# Patient Record
Sex: MALE | Race: WHITE | Employment: OTHER | URBAN - METROPOLITAN AREA
[De-identification: names, ages, dates, MRNs, and addresses within clinical notes are randomized per-mention and may not be internally consistent; named-entity substitution may affect disease eponyms.]

---

## 2021-08-10 ENCOUNTER — HOSPITAL ENCOUNTER (INPATIENT)
Age: 71
LOS: 3 days | Discharge: ANOTHER HOSPITAL | DRG: 309 | End: 2021-08-13
Attending: EMERGENCY MEDICINE | Admitting: INTERNAL MEDICINE
Payer: OTHER GOVERNMENT

## 2021-08-10 ENCOUNTER — APPOINTMENT (OUTPATIENT)
Dept: GENERAL RADIOLOGY | Age: 71
DRG: 309 | End: 2021-08-10
Payer: OTHER GOVERNMENT

## 2021-08-10 DIAGNOSIS — I48.91 ATRIAL FIBRILLATION WITH RVR (HCC): Primary | ICD-10-CM

## 2021-08-10 LAB
A/G RATIO: 1.3 (ref 1.1–2.2)
ALBUMIN SERPL-MCNC: 4.4 G/DL (ref 3.4–5)
ALP BLD-CCNC: 99 U/L (ref 40–129)
ALT SERPL-CCNC: 22 U/L (ref 10–40)
ANION GAP SERPL CALCULATED.3IONS-SCNC: 15 MMOL/L (ref 3–16)
APTT: 32.5 SEC (ref 26.2–38.6)
AST SERPL-CCNC: 30 U/L (ref 15–37)
BASOPHILS ABSOLUTE: 0.1 K/UL (ref 0–0.2)
BASOPHILS RELATIVE PERCENT: 0.7 %
BILIRUB SERPL-MCNC: 0.5 MG/DL (ref 0–1)
BUN BLDV-MCNC: 14 MG/DL (ref 7–20)
CALCIUM SERPL-MCNC: 10.7 MG/DL (ref 8.3–10.6)
CHLORIDE BLD-SCNC: 103 MMOL/L (ref 99–110)
CO2: 25 MMOL/L (ref 21–32)
CREAT SERPL-MCNC: 1.2 MG/DL (ref 0.8–1.3)
EOSINOPHILS ABSOLUTE: 0.1 K/UL (ref 0–0.6)
EOSINOPHILS RELATIVE PERCENT: 1.4 %
GFR AFRICAN AMERICAN: >60
GFR NON-AFRICAN AMERICAN: 60
GLOBULIN: 3.4 G/DL
GLUCOSE BLD-MCNC: 72 MG/DL (ref 70–99)
GLUCOSE BLD-MCNC: 81 MG/DL (ref 70–99)
GLUCOSE BLD-MCNC: 87 MG/DL (ref 70–99)
HCT VFR BLD CALC: 42.7 % (ref 40.5–52.5)
HEMOGLOBIN: 13.9 G/DL (ref 13.5–17.5)
INR BLD: 1.1 (ref 0.88–1.12)
LYMPHOCYTES ABSOLUTE: 1.4 K/UL (ref 1–5.1)
LYMPHOCYTES RELATIVE PERCENT: 20.7 %
MAGNESIUM: 1.5 MG/DL (ref 1.8–2.4)
MCH RBC QN AUTO: 29.8 PG (ref 26–34)
MCHC RBC AUTO-ENTMCNC: 32.5 G/DL (ref 31–36)
MCV RBC AUTO: 91.9 FL (ref 80–100)
MONOCYTES ABSOLUTE: 0.5 K/UL (ref 0–1.3)
MONOCYTES RELATIVE PERCENT: 6.8 %
NEUTROPHILS ABSOLUTE: 4.9 K/UL (ref 1.7–7.7)
NEUTROPHILS RELATIVE PERCENT: 70.4 %
PDW BLD-RTO: 13.8 % (ref 12.4–15.4)
PERFORMED ON: NORMAL
PERFORMED ON: NORMAL
PLATELET # BLD: 225 K/UL (ref 135–450)
PMV BLD AUTO: 8.3 FL (ref 5–10.5)
POTASSIUM REFLEX MAGNESIUM: 4.1 MMOL/L (ref 3.5–5.1)
PRO-BNP: 1166 PG/ML (ref 0–124)
PROTHROMBIN TIME: 12.6 SEC (ref 9.9–12.7)
RBC # BLD: 4.65 M/UL (ref 4.2–5.9)
SODIUM BLD-SCNC: 143 MMOL/L (ref 136–145)
TOTAL PROTEIN: 7.8 G/DL (ref 6.4–8.2)
TROPONIN: <0.01 NG/ML
TSH REFLEX: 1.72 UIU/ML (ref 0.27–4.2)
WBC # BLD: 6.9 K/UL (ref 4–11)

## 2021-08-10 PROCEDURE — 99222 1ST HOSP IP/OBS MODERATE 55: CPT | Performed by: PHYSICIAN ASSISTANT

## 2021-08-10 PROCEDURE — 83735 ASSAY OF MAGNESIUM: CPT

## 2021-08-10 PROCEDURE — 2060000000 HC ICU INTERMEDIATE R&B

## 2021-08-10 PROCEDURE — 71045 X-RAY EXAM CHEST 1 VIEW: CPT

## 2021-08-10 PROCEDURE — 83880 ASSAY OF NATRIURETIC PEPTIDE: CPT

## 2021-08-10 PROCEDURE — 2580000003 HC RX 258: Performed by: EMERGENCY MEDICINE

## 2021-08-10 PROCEDURE — 6360000002 HC RX W HCPCS: Performed by: INTERNAL MEDICINE

## 2021-08-10 PROCEDURE — 80053 COMPREHEN METABOLIC PANEL: CPT

## 2021-08-10 PROCEDURE — 99285 EMERGENCY DEPT VISIT HI MDM: CPT

## 2021-08-10 PROCEDURE — 6360000002 HC RX W HCPCS: Performed by: EMERGENCY MEDICINE

## 2021-08-10 PROCEDURE — 2580000003 HC RX 258: Performed by: NURSE PRACTITIONER

## 2021-08-10 PROCEDURE — 94761 N-INVAS EAR/PLS OXIMETRY MLT: CPT

## 2021-08-10 PROCEDURE — 36415 COLL VENOUS BLD VENIPUNCTURE: CPT

## 2021-08-10 PROCEDURE — 93005 ELECTROCARDIOGRAM TRACING: CPT | Performed by: EMERGENCY MEDICINE

## 2021-08-10 PROCEDURE — 6370000000 HC RX 637 (ALT 250 FOR IP): Performed by: NURSE PRACTITIONER

## 2021-08-10 PROCEDURE — 84484 ASSAY OF TROPONIN QUANT: CPT

## 2021-08-10 PROCEDURE — 96366 THER/PROPH/DIAG IV INF ADDON: CPT

## 2021-08-10 PROCEDURE — 85025 COMPLETE CBC W/AUTO DIFF WBC: CPT

## 2021-08-10 PROCEDURE — 94660 CPAP INITIATION&MGMT: CPT

## 2021-08-10 PROCEDURE — 85610 PROTHROMBIN TIME: CPT

## 2021-08-10 PROCEDURE — 84443 ASSAY THYROID STIM HORMONE: CPT

## 2021-08-10 PROCEDURE — 85730 THROMBOPLASTIN TIME PARTIAL: CPT

## 2021-08-10 PROCEDURE — 94640 AIRWAY INHALATION TREATMENT: CPT

## 2021-08-10 PROCEDURE — 2500000003 HC RX 250 WO HCPCS: Performed by: EMERGENCY MEDICINE

## 2021-08-10 PROCEDURE — 96365 THER/PROPH/DIAG IV INF INIT: CPT

## 2021-08-10 PROCEDURE — 2500000003 HC RX 250 WO HCPCS: Performed by: NURSE PRACTITIONER

## 2021-08-10 RX ORDER — ACETAMINOPHEN 325 MG/1
650 TABLET ORAL EVERY 6 HOURS PRN
Status: DISCONTINUED | OUTPATIENT
Start: 2021-08-10 | End: 2021-08-13 | Stop reason: HOSPADM

## 2021-08-10 RX ORDER — DEXTROSE MONOHYDRATE 25 G/50ML
12.5 INJECTION, SOLUTION INTRAVENOUS PRN
Status: DISCONTINUED | OUTPATIENT
Start: 2021-08-10 | End: 2021-08-13 | Stop reason: HOSPADM

## 2021-08-10 RX ORDER — LOSARTAN POTASSIUM 25 MG/1
12.5 TABLET ORAL DAILY
Status: DISCONTINUED | OUTPATIENT
Start: 2021-08-10 | End: 2021-08-13 | Stop reason: HOSPADM

## 2021-08-10 RX ORDER — ACETAMINOPHEN 650 MG/1
650 SUPPOSITORY RECTAL EVERY 6 HOURS PRN
Status: DISCONTINUED | OUTPATIENT
Start: 2021-08-10 | End: 2021-08-13 | Stop reason: HOSPADM

## 2021-08-10 RX ORDER — MAGNESIUM SULFATE IN WATER 40 MG/ML
2000 INJECTION, SOLUTION INTRAVENOUS ONCE
Status: COMPLETED | OUTPATIENT
Start: 2021-08-10 | End: 2021-08-10

## 2021-08-10 RX ORDER — SODIUM CHLORIDE 0.9 % (FLUSH) 0.9 %
5-40 SYRINGE (ML) INJECTION EVERY 12 HOURS SCHEDULED
Status: DISCONTINUED | OUTPATIENT
Start: 2021-08-10 | End: 2021-08-13 | Stop reason: HOSPADM

## 2021-08-10 RX ORDER — NICOTINE POLACRILEX 4 MG
15 LOZENGE BUCCAL PRN
Status: DISCONTINUED | OUTPATIENT
Start: 2021-08-10 | End: 2021-08-13 | Stop reason: HOSPADM

## 2021-08-10 RX ORDER — AMLODIPINE BESYLATE 10 MG/1
10 TABLET ORAL DAILY
Status: ON HOLD | COMMUNITY
End: 2021-08-13 | Stop reason: HOSPADM

## 2021-08-10 RX ORDER — GLIPIZIDE 5 MG/1
5 TABLET ORAL
COMMUNITY

## 2021-08-10 RX ORDER — ONDANSETRON 2 MG/ML
4 INJECTION INTRAMUSCULAR; INTRAVENOUS EVERY 6 HOURS PRN
Status: DISCONTINUED | OUTPATIENT
Start: 2021-08-10 | End: 2021-08-13 | Stop reason: HOSPADM

## 2021-08-10 RX ORDER — LOSARTAN POTASSIUM 25 MG/1
12.5 TABLET ORAL DAILY
Status: ON HOLD | COMMUNITY
End: 2021-08-13 | Stop reason: SDUPTHER

## 2021-08-10 RX ORDER — SODIUM CHLORIDE 9 MG/ML
25 INJECTION, SOLUTION INTRAVENOUS PRN
Status: DISCONTINUED | OUTPATIENT
Start: 2021-08-10 | End: 2021-08-13 | Stop reason: HOSPADM

## 2021-08-10 RX ORDER — VITAMIN B COMPLEX
2000 TABLET ORAL DAILY
Status: DISCONTINUED | OUTPATIENT
Start: 2021-08-10 | End: 2021-08-13 | Stop reason: HOSPADM

## 2021-08-10 RX ORDER — SODIUM CHLORIDE 0.9 % (FLUSH) 0.9 %
5-40 SYRINGE (ML) INJECTION PRN
Status: DISCONTINUED | OUTPATIENT
Start: 2021-08-10 | End: 2021-08-13 | Stop reason: HOSPADM

## 2021-08-10 RX ORDER — DILTIAZEM HYDROCHLORIDE 5 MG/ML
10 INJECTION INTRAVENOUS ONCE
Status: COMPLETED | OUTPATIENT
Start: 2021-08-10 | End: 2021-08-10

## 2021-08-10 RX ORDER — DICYCLOMINE HYDROCHLORIDE 10 MG/1
10 CAPSULE ORAL
Status: DISCONTINUED | OUTPATIENT
Start: 2021-08-10 | End: 2021-08-13 | Stop reason: HOSPADM

## 2021-08-10 RX ORDER — FUROSEMIDE 40 MG/1
40 TABLET ORAL DAILY
Status: ON HOLD | COMMUNITY
End: 2021-08-13 | Stop reason: SDUPTHER

## 2021-08-10 RX ORDER — FUROSEMIDE 40 MG/1
40 TABLET ORAL DAILY
Status: DISCONTINUED | OUTPATIENT
Start: 2021-08-10 | End: 2021-08-13 | Stop reason: HOSPADM

## 2021-08-10 RX ORDER — OMEPRAZOLE 20 MG/1
20 CAPSULE, DELAYED RELEASE ORAL DAILY
COMMUNITY

## 2021-08-10 RX ORDER — CARBOXYMETHYLCELLULOSE SODIUM 10 MG/ML
2 GEL OPHTHALMIC 4 TIMES DAILY
Status: DISCONTINUED | OUTPATIENT
Start: 2021-08-10 | End: 2021-08-13 | Stop reason: HOSPADM

## 2021-08-10 RX ORDER — DEXTROSE MONOHYDRATE 50 MG/ML
100 INJECTION, SOLUTION INTRAVENOUS PRN
Status: DISCONTINUED | OUTPATIENT
Start: 2021-08-10 | End: 2021-08-13 | Stop reason: HOSPADM

## 2021-08-10 RX ORDER — PANTOPRAZOLE SODIUM 40 MG/1
40 TABLET, DELAYED RELEASE ORAL
Status: DISCONTINUED | OUTPATIENT
Start: 2021-08-11 | End: 2021-08-13 | Stop reason: HOSPADM

## 2021-08-10 RX ORDER — ATORVASTATIN CALCIUM 10 MG/1
10 TABLET, FILM COATED ORAL DAILY
Status: DISCONTINUED | OUTPATIENT
Start: 2021-08-10 | End: 2021-08-13 | Stop reason: HOSPADM

## 2021-08-10 RX ORDER — ONDANSETRON 4 MG/1
4 TABLET, ORALLY DISINTEGRATING ORAL EVERY 8 HOURS PRN
Status: DISCONTINUED | OUTPATIENT
Start: 2021-08-10 | End: 2021-08-13 | Stop reason: HOSPADM

## 2021-08-10 RX ORDER — ATORVASTATIN CALCIUM 10 MG/1
10 TABLET, FILM COATED ORAL DAILY
Status: ON HOLD | COMMUNITY
End: 2021-08-13 | Stop reason: SDUPTHER

## 2021-08-10 RX ORDER — POLYETHYLENE GLYCOL 3350 17 G/17G
17 POWDER, FOR SOLUTION ORAL DAILY PRN
Status: DISCONTINUED | OUTPATIENT
Start: 2021-08-10 | End: 2021-08-13 | Stop reason: HOSPADM

## 2021-08-10 RX ORDER — FLUTICASONE PROPIONATE 110 UG/1
2 AEROSOL, METERED RESPIRATORY (INHALATION) 2 TIMES DAILY
Status: DISCONTINUED | OUTPATIENT
Start: 2021-08-10 | End: 2021-08-13 | Stop reason: HOSPADM

## 2021-08-10 RX ORDER — MULTIVIT-MIN/IRON/FOLIC ACID/K 18-600-40
1 CAPSULE ORAL DAILY
COMMUNITY

## 2021-08-10 RX ADMIN — DICYCLOMINE HYDROCHLORIDE 10 MG: 10 CAPSULE ORAL at 21:21

## 2021-08-10 RX ADMIN — ENOXAPARIN SODIUM 120 MG: 150 INJECTION SUBCUTANEOUS at 16:15

## 2021-08-10 RX ADMIN — Medication 2 PUFF: at 19:14

## 2021-08-10 RX ADMIN — MAGNESIUM SULFATE HEPTAHYDRATE 2000 MG: 40 INJECTION, SOLUTION INTRAVENOUS at 21:26

## 2021-08-10 RX ADMIN — CARBOXYMETHYLCELLULOSE SODIUM 2 DROP: 10 GEL OPHTHALMIC at 21:21

## 2021-08-10 RX ADMIN — DILTIAZEM HYDROCHLORIDE 5 MG/HR: 5 INJECTION INTRAVENOUS at 18:06

## 2021-08-10 RX ADMIN — DILTIAZEM HYDROCHLORIDE 5 MG/HR: 5 INJECTION INTRAVENOUS at 11:16

## 2021-08-10 RX ADMIN — DILTIAZEM HYDROCHLORIDE 10 MG: 5 INJECTION INTRAVENOUS at 11:12

## 2021-08-10 SDOH — ECONOMIC STABILITY: FOOD INSECURITY: WITHIN THE PAST 12 MONTHS, YOU WORRIED THAT YOUR FOOD WOULD RUN OUT BEFORE YOU GOT MONEY TO BUY MORE.: NEVER TRUE

## 2021-08-10 SDOH — HEALTH STABILITY: PHYSICAL HEALTH: ON AVERAGE, HOW MANY DAYS PER WEEK DO YOU ENGAGE IN MODERATE TO STRENUOUS EXERCISE (LIKE A BRISK WALK)?: 0 DAYS

## 2021-08-10 SDOH — ECONOMIC STABILITY: FOOD INSECURITY: WITHIN THE PAST 12 MONTHS, THE FOOD YOU BOUGHT JUST DIDN'T LAST AND YOU DIDN'T HAVE MONEY TO GET MORE.: NEVER TRUE

## 2021-08-10 SDOH — HEALTH STABILITY: PHYSICAL HEALTH: ON AVERAGE, HOW MANY MINUTES DO YOU ENGAGE IN EXERCISE AT THIS LEVEL?: 0 MIN

## 2021-08-10 SDOH — ECONOMIC STABILITY: HOUSING INSECURITY
IN THE LAST 12 MONTHS, WAS THERE A TIME WHEN YOU DID NOT HAVE A STEADY PLACE TO SLEEP OR SLEPT IN A SHELTER (INCLUDING NOW)?: NO

## 2021-08-10 SDOH — ECONOMIC STABILITY: INCOME INSECURITY: IN THE LAST 12 MONTHS, WAS THERE A TIME WHEN YOU WERE NOT ABLE TO PAY THE MORTGAGE OR RENT ON TIME?: NO

## 2021-08-10 ASSESSMENT — SOCIAL DETERMINANTS OF HEALTH (SDOH)
IN A TYPICAL WEEK, HOW MANY TIMES DO YOU TALK ON THE PHONE WITH FAMILY, FRIENDS, OR NEIGHBORS?: MORE THAN THREE TIMES A WEEK
HOW OFTEN DO YOU ATTENT MEETINGS OF THE CLUB OR ORGANIZATION YOU BELONG TO?: NEVER
HOW HARD IS IT FOR YOU TO PAY FOR THE VERY BASICS LIKE FOOD, HOUSING, MEDICAL CARE, AND HEATING?: NOT VERY HARD
HOW OFTEN DO YOU ATTEND CHURCH OR RELIGIOUS SERVICES?: NEVER
DO YOU BELONG TO ANY CLUBS OR ORGANIZATIONS SUCH AS CHURCH GROUPS UNIONS, FRATERNAL OR ATHLETIC GROUPS, OR SCHOOL GROUPS?: NO
HOW OFTEN DO YOU GET TOGETHER WITH FRIENDS OR RELATIVES?: ONCE A WEEK

## 2021-08-10 ASSESSMENT — PAIN SCALES - GENERAL: PAINLEVEL_OUTOF10: 0

## 2021-08-10 ASSESSMENT — LIFESTYLE VARIABLES
HOW OFTEN DO YOU HAVE A DRINK CONTAINING ALCOHOL: NEVER
HOW MANY STANDARD DRINKS CONTAINING ALCOHOL DO YOU HAVE ON A TYPICAL DAY: 1 OR 2

## 2021-08-10 NOTE — PROGRESS NOTES
Admit to PCU on tele  New onset a-fib RVR    Serena Paiz [facility]  8/10/2021

## 2021-08-10 NOTE — ED NOTES
Pt alert and without s/s distress or discomfort, denies CP, chest pressure or SOB, cardiac monitor applied. Pt showing A Fib with RVR.  MD @ bedside     Robert Code, ROBI  08/10/21 1127

## 2021-08-10 NOTE — ED NOTES
Pt states he feels \"better\" after IV Cardizem initiated.  Cardizem infusing per order and without s/s infiltration or adverse reactions     Chanda Bauer RN  08/10/21 7499

## 2021-08-10 NOTE — PROGRESS NOTES
Patient admitted to room 319 from [facility]. Patient oriented to room, call light, bed rails, phone, lights and bathroom. Patient instructed about the schedule of the day including: vital sign frequency, lab draws, possible tests, frequency of MD and staff rounds, daily weights, I &O's and prescribed diet. bed alarm in place, patient aware of placement and reason. #19 Telemetry box in place, patient aware of placement and reason. Bed locked, in lowest position, side rails up 2/4, call light within reach. Recliner Assessment  Patient is not able to demonstrated the ability to move from a reclining position to an upright position within the recliner. however patient is alert, oriented and able to provide informed consent    4 Eyes Skin Assessment     The patient is being assess for   Admission    I agree that 2 RN's have performed a thorough Head to Toe Skin Assessment on the patient. ALL assessment sites listed below have been assessed. Areas assessed for pressure by both nurses:   [x]   Head, Face, and Ears   [x]   Shoulders, Back, and Chest, Abdomen  [x]   Arms, Elbows, and Hands   [x]   Coccyx, Sacrum, and Ischium  [x]   Legs, Feet, and Heels    Scattered bruising and abrasions. Skin Assessed Under all Medical Devices by both nurses: NA                All Mepilex Borders were peeled back and area peeked at by both nurses:  No:    Please list where Mepilex Borders are located:  none in place           Co-signer eSignature: Electronically signed by Miguelina Crowell RN on 8/10/21 at 6:01 PM EDT    Does the Patient have Skin Breakdown related to pressure?   No              Filiberto Prevention initiated:  No   Wound Care Orders initiated:  NA      [facility] nurse consulted for Pressure Injury (Stage 3,4, Unstageable, DTI, NWPT, Complex wounds)and New or Established Ostomies:  NA      Primary Nurse eSignature: Electronically signed by Michelle Serra RN on 8/10/21 at 5:26 PM EDT

## 2021-08-10 NOTE — ED PROVIDER NOTES
[facility]      CHIEF COMPLAINT  Irregular Heart Beat (Pt noted to be in A fib with RVR @ [facility] 16)       [facility]  Daniele Gupta is a 72 y.o. male  who presents to the ED complaining of concern for afib with RVR. Patient sent from [facility] in [facility] after HR noted to be anywhere from . Had ECG done and noted to be in afib with RVr, which is new for him. Was at the clinic for pharmacy visit to discuss his diet and diabetes and medications. States doesn't feel bad. Feels dyspnea always per patient, no significant change to that. No chest pain. Feels lightheaded and dizzy \"a lot\" but not today. No vomiting or diarrhea. No thyroid issues that he is aware of. No other complaints, modifying factors or associated symptoms. I have reviewed the following from the nursing documentation. Past Medical History:   Diagnosis Date    Antral gastritis 11    with possible gastroparesis    Bronchitis chronic     CHF (congestive heart failure) (HCC)     Chronic abdominal pain     [facility]; EGD, col., CT, RUQ U/S, and HIDA unrem.  Chronic back pain     COPD     PFT's show no obstr. or restr. defect    Diabetes mellitus ([address]     Emphysema of lung ([address]     mild,, bullous    GERD (gastroesophageal reflux disease)     Headache(784.0)     Hiatal hernia 11    Hyperlipidemia     Neg. Cor. Angio. (per 11 o.v. note) & neg. . Echo with Myoview (per 2/25/10 note)    Hypertension     ?     Impotence     Meniere disease     MONICA (obstructive sleep apnea) 10/2/2010    severe; AHI-47; on CPAP pressure of 18    Peripheral vascular disease (HCC)     Pulmonary nodule     Pulmonary nodule     Rash     Renal cyst     small, 1 on each kidney per CT Abd. 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 Kevin Zendejas cerebral artery occlusion with cerebral infarction     Vitamin D deficiency 2012    6 ng/mL     Past Surgical History:   Procedure Laterality Date    AMPUTATION      right index    COLONOSCOPY  2011    ENDOSCOPY, COLON, DIAGNOSTIC      EXTERNAL EAR SURGERY  3/29/2012    Dr. Charlie Muro; benign squamous papilloma    SPIROMETRY  2009    Total lung capacity improvement over  PFT    UPPER GASTROINTESTINAL ENDOSCOPY  11     Dr. Richie Miles; antral gastritis, hiatal hernia, & possible gastroparesis     Family History   Problem Relation Age of Onset    Emphysema Father     Diabetes Father     Heart Failure Father     Heart Disease Father         chf    Asthma Maternal Grandmother     Stroke Mother [de-identified]    Cancer Brother 54        pancreatic    Diabetes Daughter     Asthma Daughter    [facility] Cancer Daughter     Hypertension Neg Hx      Social History     Socioeconomic History    Marital status: Single     Spouse name: Not on file    Number of children: Not on file    Years of education: Not on file    Highest education level: Not on file   Occupational History    Not on file   Tobacco Use    Smoking status: Former Smoker     Packs/day: 2.50     Years: 30.00     Pack years: 75.00     Types: Cigarettes     Quit date: 1996     Years since quittin.2    Smokeless tobacco: Never Used   Substance and Sexual Activity    Alcohol use: No    Drug use: No    Sexual activity: Never   Other Topics Concern    Not on file   Social History Narrative    Not on file     Social Determinants of Health     Financial Resource Strain:     Difficulty of Paying Living Expenses:    Food Insecurity:     Worried About Running Out of Food in the Last Year:     [address] in the Last Year:    Transportation Needs:     Lack of Transportation (Medical):      Lack of Transportation (Non-Medical):    Physical Activity:     Days of Exercise per Week:     Minutes of Exercise per Session:    Stress:     Feeling of Stress :    Social Connections:     Frequency of Communication with Friends and Family:     Frequency of Social Gatherings with Friends and Family:     Attends Denominational Services:     Active Member of Clubs or Organizations:     Attends Club or Organization Meetings:     Marital Status:    Intimate Partner Violence:     Fear of Current or Ex-Partner:     Emotionally Abused:     Physically Abused:     Sexually Abused:      Current Facility-Administered Medications   Medication Dose Route Frequency Provider Last Rate Last Admin    dilTIAZem 125 mg in dextrose 5 % 125 mL infusion  5-15 mg/hr Intravenous Continuous Asa Jimenez MD 10 mL/hr at 08/10/21 1228 10 mg/hr at 08/10/21 1228    enoxaparin (LOVENOX) injection 120 mg  1 mg/kg Subcutaneous Once Asa Jimenez MD         Current Outpatient Medications   Medication Sig Dispense Refill    amLODIPine (NORVASC) 10 MG tablet Take 10 mg by mouth daily      losartan (COZAAR) 25 MG tablet Take 12.5 mg by mouth daily      metoprolol tartrate (LOPRESSOR) 25 MG tablet Take 25 mg by mouth 2 times daily      furosemide (LASIX) 40 MG tablet Take 40 mg by mouth daily      budesonide (PULMICORT) 180 MCG/ACT AEPB inhaler Inhale 2 puffs into the lungs 2 times daily      atorvastatin (LIPITOR) 10 MG tablet Take 10 mg by mouth daily      glipiZIDE (GLUCOTROL) 5 MG tablet Take 5 mg by mouth 2 times daily (before meals) Take 2 tabs before breakfast and 1 before supper      metFORMIN (GLUCOPHAGE) 1000 MG tablet Take 1,000 mg by mouth 2 times daily (with meals)      SEMAGLUTIDE, 1 MG/DOSE, SC Inject 0.25 mg into the skin once a week      glycerin-hypromellose- 0.2-0.2-1 % SOLN opthalmic solution Place 2 drops into both eyes 4 times daily      omeprazole (PRILOSEC) 20 MG delayed release capsule Take 20 mg by mouth daily      Cholecalciferol (VITAMIN D) 50 MCG (2000 UT) CAPS capsule Take 1 capsule by mouth daily      meclizine (ANTIVERT) 25 MG CHEW Take 25 mg by mouth 3 times daily as needed      13.8 12.4 - 15.4 %    Platelets 071 699 - 300 K/uL    MPV 8.3 5.0 - 10.5 fL    Neutrophils % 70.4 %    Lymphocytes % 20.7 %    Monocytes % 6.8 %    Eosinophils % 1.4 %    Basophils % 0.7 %    Neutrophils Absolute 4.9 1.7 - 7.7 K/uL    Lymphocytes Absolute 1.4 1.0 - 5.1 K/uL    Monocytes Absolute 0.5 0.0 - 1.3 K/uL    Eosinophils Absolute 0.1 0.0 - 0.6 K/uL    Basophils Absolute 0.1 0.0 - 0.2 K/uL   Comprehensive Metabolic Panel w/ Reflex to MG   Result Value Ref Range    Sodium 143 136 - 145 mmol/L    Potassium reflex Magnesium 4.1 3.5 - 5.1 mmol/L    Chloride 103 99 - 110 mmol/L    CO2 25 21 - 32 mmol/L    Anion Gap 15 3 - 16    Glucose 81 70 - 99 mg/dL    BUN 14 7 - 20 mg/dL    CREATININE 1.2 0.8 - 1.3 mg/dL    GFR Non-African American 60 (A) >60    GFR African American >60 >60    Calcium 10.7 (H) 8.3 - 10.6 mg/dL    Total Protein 7.8 6.4 - 8.2 g/dL    Albumin 4.4 3.4 - 5.0 g/dL    Albumin/Globulin Ratio 1.3 1.1 - 2.2    Total Bilirubin 0.5 0.0 - 1.0 mg/dL    Alkaline Phosphatase 99 40 - 129 U/L    ALT 22 10 - 40 U/L    AST 30 15 - 37 U/L    Globulin 3.4 g/dL   Troponin   Result Value Ref Range    Troponin <0.01 <0.01 ng/mL   Brain Natriuretic Peptide   Result Value Ref Range    Pro-BNP 1,166 (H) 0 - 124 pg/mL   Protime-INR   Result Value Ref Range    Protime 12.6 9.9 - 12.7 sec    INR 1.10 0.88 - 1.12   APTT   Result Value Ref Range    aPTT 32.5 26.2 - 38.6 sec   EKG 12 Lead   Result Value Ref Range    Ventricular Rate 138 BPM    Atrial Rate 115 BPM    QRS Duration 140 ms    Q-T Interval 346 ms    QTc Calculation (Bazett) 524 ms    R Axis -66 degrees    T Axis 3 degrees    Diagnosis       Atrial fibrillation with rapid ventricular response with premature ventricular or aberrantly conducted complexesRight bundle branch blockLeft anterior fascicular block Bifascicular block Abnormal ECGNo previous ECGs available       ECG  The Ekg interpreted by me shows  atrial fibrillation with a rate of 138 with RVR  Eugene is Normal  QTc is  524ms  Intervals and Durations are unremarkable. ST Segments: nonspecific changes. Bifascicular block. A. fib with RVR is new when compared with previous EKG dated 2/10/2016    RADIOLOGY  XR CHEST PORTABLE    Result Date: 8/10/2021  EXAMINATION: ONE XRAY VIEW OF THE CHEST 8/10/2021 10:59 am COMPARISON: None. HISTORY: ORDERING SYSTEM PROVIDED HISTORY: new onset afib rvr TECHNOLOGIST PROVIDED HISTORY: Reason for exam:->new onset afib rvr Reason for Exam: htn, sob, hx copd, hx emphysema Acuity: Acute Type of Exam: Initial FINDINGS: Heart size and pulmonary vasculature within normal limits. Thoracic aorta tortuous. Strandy opacities in the lung bases. Lungs otherwise clear     1. No evidence of pulmonary edema 2. Bibasilar atelectasis       ED COURSE/MDM  Patient seen and evaluated. Old records reviewed. Labs and imaging reviewed and results discussed with patient. Patient presenting for evaluation of new onset A. fib with RVR found at an outpatient appointment. Denies any current chest pain, he has stable chronic dyspnea but no change today, no lightheadedness. Will attempt rate control with IV diltiazem. Patient given IV diltiazem bolus followed by drip. EKG without acute ischemic changes and troponin initially negative. Chest x-ray without any pulmonary edema. Will closely monitor his blood pressure as we titrate the diltiazem to obtain rate control with a goal rate less than 100. Single dose of therapeutic Lovenox has been given. He is established with the [facility], therefore after discussion plan for admission of the patient who would prefer to be transferred there for further evaluation and management for continuity of care. However, after consulting the providers at the [place], we were notified that they were at capacity and unable to accept the patient for transfer.   Based on that information I have informed the patient he is willing to be admitted at [facility] rather than the [place]. Hospitalist at [facility] is been consulted. I spoke with Tersea Dc with the hospitalist team at [facility]. We thoroughly discussed the history, physical exam, laboratory and imaging studies, as well as, emergency department course. Based upon that discussion, we've decided to admit Skyler Mcclure for further observation and evaluation of Myesha Hernandez's new onset afib with RVR. As I have deemed necessary from their history, physical, and studies, I have considered and evaluated Skyler Mcclure for the following diagnoses:  PNEUMONIA, CHF EXACERBATION, ACUTE CORONARY SYNDROME, PERICARDIAL TAMPONADE, PNEUMOTHORAX, PULMONARY EMBOLISM, and THORACIC DISSECTION. The total Critical Care time is 45 minutes which excludes separately billable procedures. During the patient's ED course, the patient was given:  Medications   dilTIAZem injection 10 mg (10 mg Intravenous Given 8/10/21 1112)     Followed by   dilTIAZem 125 mg in dextrose 5 % 125 mL infusion (10 mg/hr Intravenous Rate/Dose Change 8/10/21 1228)   enoxaparin (LOVENOX) injection 120 mg (has no administration in time range)        CLINICAL IMPRESSION  1. Atrial fibrillation with RVR (HCC)        Blood pressure 122/78, pulse 111, temperature 98.9 °F (37.2 °C), temperature source Oral, resp. rate 18, height 6' (1.829 m), weight 260 lb (117.9 kg), SpO2 99 %. DISPOSITION  Skyler Mcclure was admitted in stable condition. DISCLAIMER: This chart was created using Dragon dictation software. Efforts were made by me to ensure accuracy, however some errors may be present due to limitations of this technology and occasionally words are not transcribed correctly.         Rocio Pardo MD  08/10/21 1626

## 2021-08-10 NOTE — H&P
Hospital Medicine History & Physical      PCP: No primary care provider on file. Date of Admission: 8/10/2021    Date of Service: Pt seen/examined on 8/10/2021    Chief Complaint:    Chief Complaint   Patient presents with    Irregular Heart Beat     Pt noted to be in A fib with RVR @ [facility] 16     History Of Present Illness: The patient is a 72 y.o. male with CHF, chronic abdominal pain, COPD, DM, GERD, HLD, HTN who presented to [facility] ED with complaint of irregular heart beat. Patient reports he is following up with the [facility] clinic just for regular checkup when he was noted to be in A. fib with RVR. Patient states he is never been told that he has had this problem in the past.  He has a history of CHF and takes Lasix but otherwise has never been told any as there issues with his heart. He states he was otherwise feeling fine. He is always short of breath but did not notice anything new. He states the only thing that was different is that about a month ago he did have some right-sided chest pain and has some continued soreness to the right chest wall but states that it has gotten better. He denies any associated fevers or chills, palpitations, dizziness or lightheadedness. Past Medical History:        Diagnosis Date    Antral gastritis 11    with possible gastroparesis    Bronchitis chronic     CHF (congestive heart failure) (HCC)     Chronic abdominal pain     saw Dr. Luzmaria Rodriguez on 12; EGD, col., CT, RUQ U/S, and HIDA unrem.  Chronic back pain     COPD     PFT's show no obstr. or restr. defect    Diabetes mellitus ([address]     Emphysema of lung ([address]     mild,, bullous    GERD (gastroesophageal reflux disease)     Headache(784.0)     Hiatal hernia 11    Hyperlipidemia     Neg. Cor. Angio. (per 11 o.v. note) & neg. . Echo with Myoview (per 2/25/10 note)    Hypertension     ?     Impotence     Meniere disease     MONICA (obstructive sleep apnea) 10/2/2010 severe; AHI-47; on CPAP pressure of 18    Peripheral vascular disease (HCC)     Pulmonary nodule     Pulmonary nodule     Rash     Renal cyst     small, 1 on each kidney per CT Abd. 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 Miley Dentno cerebral artery occlusion with cerebral infarction     Vitamin D deficiency 2/27/2012    6 ng/mL       Past Surgical History:        Procedure Laterality Date    AMPUTATION      right index    COLONOSCOPY  9/27/2011    ENDOSCOPY, COLON, DIAGNOSTIC      EXTERNAL EAR SURGERY  3/29/2012    Dr. Efe Cuba; benign squamous papilloma    SPIROMETRY  11-2-2009    Total lung capacity improvement over 2007 PFT    UPPER GASTROINTESTINAL ENDOSCOPY  4/21/11     Dr. Margo Pina; antral gastritis, hiatal hernia, & possible gastroparesis       Medications Prior to Admission:    Prior to Admission medications    Medication Sig Start Date End Date Taking?  Authorizing Provider   amLODIPine (NORVASC) 10 MG tablet Take 10 mg by mouth daily   Yes Historical Provider, MD   losartan (COZAAR) 25 MG tablet Take 12.5 mg by mouth daily   Yes Historical Provider, MD   metoprolol tartrate (LOPRESSOR) 25 MG tablet Take 25 mg by mouth 2 times daily   Yes Historical Provider, MD   furosemide (LASIX) 40 MG tablet Take 40 mg by mouth daily   Yes Historical Provider, MD   budesonide (PULMICORT) 180 MCG/ACT AEPB inhaler Inhale 2 puffs into the lungs 2 times daily   Yes Historical Provider, MD   atorvastatin (LIPITOR) 10 MG tablet Take 10 mg by mouth daily   Yes Historical Provider, MD   glipiZIDE (GLUCOTROL) 5 MG tablet Take 5 mg by mouth 2 times daily (before meals) Take 2 tabs before breakfast and 1 before supper   Yes Historical Provider, MD   metFORMIN (GLUCOPHAGE) 1000 MG tablet Take 1,000 mg by mouth 2 times daily (with meals)   Yes Historical Provider, MD   SEMAGLUTIDE, 1 MG/DOSE, SC Inject 0.25 mg into the skin once a week   Yes Historical Provider, MD glycerin-hypromellose- 0.2-0.2-1 % SOLN opthalmic solution Place 2 drops into both eyes 4 times daily   Yes Historical Provider, MD   omeprazole (PRILOSEC) 20 MG delayed release capsule Take 20 mg by mouth daily   Yes Historical Provider, MD   Cholecalciferol (VITAMIN D) 50 MCG (2000 UT) CAPS capsule Take 1 capsule by mouth daily   Yes Historical Provider, MD   meclizine (ANTIVERT) 25 MG CHEW Take 25 mg by mouth 3 times daily as needed    Historical Provider, MD   ondansetron (ZOFRAN) 8 MG tablet Take 1 tablet by mouth every 8 hours as needed for Nausea for 10 doses. 11/13/13   Flako Anderson MD   COMBIVENT  MCG/ACT inhaler INHALE 2 PUFFS INTO THE LUNGS EVERY 6 HOURS AS NEEDED FOR WHEEZING AND SHORTNESS OF BREATH. 11/27/12   Garrel MD Phuc   dicyclomine (BENTYL) 10 MG capsule Take 10 mg by mouth 4 times daily (before meals and nightly). Historical Provider, MD       Allergies:  Patient has no known allergies. Social History:  The patient currently lives at home     TOBACCO:   reports that he quit smoking about 25 years ago. His smoking use included cigarettes. He has a 75.00 pack-year smoking history. He has never used smokeless tobacco.  ETOH:   reports no history of alcohol use.       Family History:   Positive as follows:        Problem Relation Age of Onset    Emphysema Father     Diabetes Father     Heart Failure Father     Heart Disease Father         chf    Asthma Maternal Grandmother     Stroke Mother [de-identified]    Cancer Brother 54        pancreatic    Diabetes Daughter     Asthma Daughter     Cancer Daughter     Hypertension Neg Hx        REVIEW OF SYSTEMS:     Constitutional: Negative for fever   HENT: Negative for sore throat   Eyes: Negative for redness   Respiratory: + Shortness of breath  Cardiovascular: Negative for chest pain or palpitations  Gastrointestinal: Negative for vomiting, diarrhea   Genitourinary: Negative for hematuria   Musculoskeletal: Negative for arthralgias y.o.) 0   D DM No 0   S2 Prior Stroke/TIA No 0   V Vascular Disease No 0   A Age 74-69 Yes,  (76 y.o.) 1   Sc Sex male 0    [id number]  Score  2   Score last updated 8/10/21 6:21 PM EDT    Click here for a link to the UpToDate guideline \"Atrial Fibrillation: Anticoagulation therapy to prevent embolization    Disclaimer: Risk Score calculation is dependent on accuracy of patient problem list and past encounter diagnosis.     Pertinent previous results reviewed   None     ASSESSMENT/PLAN:  New Onset Atrial fibrillation with RVR   - HR in the 140's on arrival to the ER   -  Given diltiazem bolus and started on gtt   - Tele monitor   - EKG with bifascicular block   - Troponin negaitve x 1, trend  - TSH pending   - [id number]: 2, AC with full dose Lovenox for now for PPx coverage   - Echo pending   - Cardiology consult     Hypercalcemia  - Mild, 10.7  - trend     HLD  - Continue statin     HTN  - BP is controlled   - Continue Cozaar, BB  - hold Norvasc as Cardizem started for atrial fibrillation     COPD   - No AE   - Continue home inhalers PRN     DM2  - hold home oral Rx   - Continue SSI   - BG is well controlled     Menieres Disease  - stable     Chronic dCHF  - per patient report   - Continue home Lasix   - Mild edema on exam     DVT Prophylaxis: Full dose Lovenox   Diet: No diet orders on file   Code Status: Prior      Sandi Zaragoza PA-C  8/10/2021 2:17 PM

## 2021-08-10 NOTE — ED NOTES
Report called to Celina Riley RN @ [facility] in SBAR format.  Pt to ambulance via cot without incident or c/o's     John Powers RN  08/10/21 4002

## 2021-08-11 PROBLEM — I50.9 CHF (CONGESTIVE HEART FAILURE) (HCC): Status: ACTIVE | Noted: 2021-08-11

## 2021-08-11 LAB
ANION GAP SERPL CALCULATED.3IONS-SCNC: 11 MMOL/L (ref 3–16)
BUN BLDV-MCNC: 16 MG/DL (ref 7–20)
CALCIUM SERPL-MCNC: 9.8 MG/DL (ref 8.3–10.6)
CHLORIDE BLD-SCNC: 101 MMOL/L (ref 99–110)
CO2: 27 MMOL/L (ref 21–32)
CREAT SERPL-MCNC: 1.2 MG/DL (ref 0.8–1.3)
EKG ATRIAL RATE: 115 BPM
EKG DIAGNOSIS: NORMAL
EKG Q-T INTERVAL: 346 MS
EKG QRS DURATION: 140 MS
EKG QTC CALCULATION (BAZETT): 524 MS
EKG R AXIS: -66 DEGREES
EKG T AXIS: 3 DEGREES
EKG VENTRICULAR RATE: 138 BPM
GFR AFRICAN AMERICAN: >60
GFR NON-AFRICAN AMERICAN: 60
GLUCOSE BLD-MCNC: 100 MG/DL (ref 70–99)
GLUCOSE BLD-MCNC: 120 MG/DL (ref 70–99)
GLUCOSE BLD-MCNC: 202 MG/DL (ref 70–99)
GLUCOSE BLD-MCNC: 79 MG/DL (ref 70–99)
GLUCOSE BLD-MCNC: 88 MG/DL (ref 70–99)
GLUCOSE BLD-MCNC: 99 MG/DL (ref 70–99)
HCT VFR BLD CALC: 40.2 % (ref 40.5–52.5)
HEMOGLOBIN: 13 G/DL (ref 13.5–17.5)
MCH RBC QN AUTO: 29.8 PG (ref 26–34)
MCHC RBC AUTO-ENTMCNC: 32.2 G/DL (ref 31–36)
MCV RBC AUTO: 92.4 FL (ref 80–100)
PDW BLD-RTO: 13.3 % (ref 12.4–15.4)
PERFORMED ON: ABNORMAL
PERFORMED ON: NORMAL
PERFORMED ON: NORMAL
PLATELET # BLD: 177 K/UL (ref 135–450)
PMV BLD AUTO: 8.9 FL (ref 5–10.5)
POTASSIUM SERPL-SCNC: 3.7 MMOL/L (ref 3.5–5.1)
RBC # BLD: 4.35 M/UL (ref 4.2–5.9)
SODIUM BLD-SCNC: 139 MMOL/L (ref 136–145)
TSH REFLEX FT4: 2.22 UIU/ML (ref 0.27–4.2)
WBC # BLD: 4.8 K/UL (ref 4–11)

## 2021-08-11 PROCEDURE — 93010 ELECTROCARDIOGRAM REPORT: CPT | Performed by: INTERNAL MEDICINE

## 2021-08-11 PROCEDURE — 94761 N-INVAS EAR/PLS OXIMETRY MLT: CPT

## 2021-08-11 PROCEDURE — 6360000002 HC RX W HCPCS: Performed by: NURSE PRACTITIONER

## 2021-08-11 PROCEDURE — 2060000000 HC ICU INTERMEDIATE R&B

## 2021-08-11 PROCEDURE — 6370000000 HC RX 637 (ALT 250 FOR IP): Performed by: NURSE PRACTITIONER

## 2021-08-11 PROCEDURE — 84443 ASSAY THYROID STIM HORMONE: CPT

## 2021-08-11 PROCEDURE — 94660 CPAP INITIATION&MGMT: CPT

## 2021-08-11 PROCEDURE — 99223 1ST HOSP IP/OBS HIGH 75: CPT | Performed by: INTERNAL MEDICINE

## 2021-08-11 PROCEDURE — 94640 AIRWAY INHALATION TREATMENT: CPT

## 2021-08-11 PROCEDURE — 93306 TTE W/DOPPLER COMPLETE: CPT

## 2021-08-11 PROCEDURE — 2580000003 HC RX 258: Performed by: INTERNAL MEDICINE

## 2021-08-11 PROCEDURE — 2500000003 HC RX 250 WO HCPCS: Performed by: INTERNAL MEDICINE

## 2021-08-11 PROCEDURE — 36415 COLL VENOUS BLD VENIPUNCTURE: CPT

## 2021-08-11 PROCEDURE — 6370000000 HC RX 637 (ALT 250 FOR IP): Performed by: INTERNAL MEDICINE

## 2021-08-11 PROCEDURE — 99233 SBSQ HOSP IP/OBS HIGH 50: CPT | Performed by: INTERNAL MEDICINE

## 2021-08-11 PROCEDURE — 2700000000 HC OXYGEN THERAPY PER DAY

## 2021-08-11 PROCEDURE — 85027 COMPLETE CBC AUTOMATED: CPT

## 2021-08-11 PROCEDURE — 80048 BASIC METABOLIC PNL TOTAL CA: CPT

## 2021-08-11 RX ORDER — METOPROLOL SUCCINATE 50 MG/1
50 TABLET, EXTENDED RELEASE ORAL DAILY
Status: DISCONTINUED | OUTPATIENT
Start: 2021-08-11 | End: 2021-08-11

## 2021-08-11 RX ORDER — DILTIAZEM HYDROCHLORIDE 240 MG/1
240 CAPSULE, COATED, EXTENDED RELEASE ORAL DAILY
Status: DISCONTINUED | OUTPATIENT
Start: 2021-08-11 | End: 2021-08-11

## 2021-08-11 RX ORDER — DILTIAZEM HYDROCHLORIDE 5 MG/ML
15 INJECTION INTRAVENOUS ONCE
Status: COMPLETED | OUTPATIENT
Start: 2021-08-11 | End: 2021-08-11

## 2021-08-11 RX ORDER — METOPROLOL SUCCINATE 50 MG/1
50 TABLET, EXTENDED RELEASE ORAL DAILY
Status: DISCONTINUED | OUTPATIENT
Start: 2021-08-11 | End: 2021-08-12

## 2021-08-11 RX ADMIN — DICYCLOMINE HYDROCHLORIDE 10 MG: 10 CAPSULE ORAL at 05:14

## 2021-08-11 RX ADMIN — LOSARTAN POTASSIUM 12.5 MG: 25 TABLET, FILM COATED ORAL at 15:50

## 2021-08-11 RX ADMIN — ENOXAPARIN SODIUM 120 MG: 120 INJECTION SUBCUTANEOUS at 05:14

## 2021-08-11 RX ADMIN — Medication 2 PUFF: at 19:15

## 2021-08-11 RX ADMIN — DILTIAZEM HYDROCHLORIDE 15 MG: 5 INJECTION INTRAVENOUS at 21:26

## 2021-08-11 RX ADMIN — ENOXAPARIN SODIUM 120 MG: 120 INJECTION SUBCUTANEOUS at 18:57

## 2021-08-11 RX ADMIN — DILTIAZEM HYDROCHLORIDE 5 MG/HR: 5 INJECTION INTRAVENOUS at 21:33

## 2021-08-11 RX ADMIN — ATORVASTATIN CALCIUM 10 MG: 10 TABLET, FILM COATED ORAL at 15:28

## 2021-08-11 RX ADMIN — METOPROLOL SUCCINATE 50 MG: 50 TABLET, EXTENDED RELEASE ORAL at 15:28

## 2021-08-11 RX ADMIN — FUROSEMIDE 40 MG: 40 TABLET ORAL at 09:48

## 2021-08-11 RX ADMIN — PANTOPRAZOLE SODIUM 40 MG: 40 TABLET, DELAYED RELEASE ORAL at 05:14

## 2021-08-11 RX ADMIN — DICYCLOMINE HYDROCHLORIDE 10 MG: 10 CAPSULE ORAL at 09:48

## 2021-08-11 NOTE — PROGRESS NOTES
End of shift report given to [facility], [address]. Transfer of care done at this time.

## 2021-08-11 NOTE — PLAN OF CARE
Problem: Falls - Risk of:  Goal: Will remain free from falls  Description: Will remain free from falls  8/11/2021 1122 by Jailyn Henry RN  Outcome: Ongoing  8/11/2021 0416 by Jesus Tapia RN  Outcome: Ongoing  8/11/2021 0412 by Jesus Tapia RN  Outcome: Ongoing  Goal: Absence of physical injury  Description: Absence of physical injury  8/11/2021 1122 by Jailyn Henry RN  Outcome: Ongoing  8/11/2021 0416 by Jesus Tapia RN  Outcome: Ongoing  8/11/2021 0412 by Jesus Tapia RN  Outcome: Ongoing     Problem: Cardiac:  Goal: Ability to maintain an adequate cardiac output will improve  Description: Ability to maintain an adequate cardiac output will improve  8/11/2021 1122 by Jailyn Henry RN  Outcome: Ongoing  8/11/2021 0416 by Jesus Tapia RN  Outcome: Ongoing  8/11/2021 0412 by Jesus Tapia RN  Outcome: Ongoing  Goal: Hemodynamic stability will improve  Description: Hemodynamic stability will improve  8/11/2021 1122 by Jailyn Henry RN  Outcome: Ongoing  8/11/2021 0416 by Jesus Tapia RN  Outcome: Ongoing  8/11/2021 0412 by Jesus Tapia RN  Outcome: Ongoing

## 2021-08-11 NOTE — PROGRESS NOTES
40 mg Oral QAM AC    sodium chloride flush  5-40 mL Intravenous 2 times per day    enoxaparin  1 mg/kg Subcutaneous Q12H    insulin lispro  0-6 Units Subcutaneous TID WC    insulin lispro  0-3 Units Subcutaneous Nightly       Continuous Infusions:   sodium chloride      dextrose         PRN Meds:  Combivent, sodium chloride flush, sodium chloride, ondansetron **OR** ondansetron, polyethylene glycol, acetaminophen **OR** acetaminophen, perflutren lipid microspheres, glucose, dextrose, glucagon (rDNA), dextrose      Data:  CBC:   Recent Labs     08/10/21  1037 08/11/21  0438   WBC 6.9 4.8   HGB 13.9 13.0*   HCT 42.7 40.2*   MCV 91.9 92.4    177     BMP:   Recent Labs     08/10/21  1037 08/11/21  0438    139   K 4.1 3.7    101   CO2 25 27   BUN 14 16   CREATININE 1.2 1.2     LIVER PROFILE:   Recent Labs     08/10/21  1037   AST 30   ALT 22   BILITOT 0.5   ALKPHOS 99     PT/INR:   Recent Labs     08/10/21  1037   PROTIME 12.6   INR 1.10     Cultures:  None      [id number] Score for Atrial Fibrillation Stroke Risk   Risk   Factors  Component Value   C CHF Yes 1   H HTN Yes 1   A2 Age >= 75 No,  (76 y.o.) 0   D DM Yes 1   S2 Prior Stroke/TIA No 0   V Vascular Disease No 0   A Age 74-69 Yes,  (76 y.o.) 1   Sc Sex male 0    [id number]  Score  4   Score last updated 8/11/21 9:71 AM EDT    Click here for a link to the UpToDate guideline \"Atrial Fibrillation: Anticoagulation therapy to prevent embolization    Disclaimer: Risk Score calculation is dependent on accuracy of patient problem list and past encounter diagnosis. XR CHEST PORTABLE   Final Result   1. No evidence of pulmonary edema   2.  Bibasilar atelectasis                Assessment:  Principal Problem:    Atrial fibrillation with RVR (HCC)  Active Problems:    Hypertension    Hyperlipidemia    COPD (chronic obstructive pulmonary disease) (HCC)    Hypercalcemia    Type 2 diabetes mellitus without complication (HCC)    CHF (congestive heart failure) ([facility]  Resolved Problems:    * No resolved hospital problems. *      Plan:    New Onset Atrial fibrillation with RVR   - HR in the 140's on arrival to the ER   -  Given diltiazem bolus and started on gtt. Change to PO Cardizem  mg daily.   - Tele monitor   - EKG with bifascicular block   - Troponin negaitve x 1, trend  - TSH pending   - [id number]: 4, AC with full dose Lovenox for now for PPx coverage   - Echo pending   - Cardiology consult      Hypercalcemia resolved  - Mild, 10.7-->9.8       HLD  - Continue statin      HTN  - BP is controlled   - Continue Cozaar, BB  - hold Norvasc as Cardizem started for atrial fibrillation. Will start oral Cardizem. If he has diminished EF on ECHO will switch to BB. Chronic CHF type unknown. - he has history of CHF. He does not know his EF. Await ECHO finding.  On Lasix.      COPD   - No AE   - Continue home inhalers PRN      DM2  - hold home oral Rx   - Continue SSI   - BG is well controlled      Menieres Disease  - stable       Discharge planning        DVT Prophylaxis: Full dose Lovenox   Diet: No diet orders on file   Code Status: Prior    Miya Aguilar MD, MD 8/11/2021 6:46 AM

## 2021-08-11 NOTE — PROGRESS NOTES
Bedside shift report given to Mone guerra. Patient resting in bed on room air. Remains in Afib w/ rates in low 100s on cardizem gtt at 10mg/hr. Call light in patient's reach.

## 2021-08-11 NOTE — PROGRESS NOTES
Cared discussed with Dr. Goyo Castle. EKG reviewed. Troponins negative. Agree with stress for further evaluation. Our service will see but may proceed with evaluation.        Mart Hairston MD, [address] [facility]  [phone] [facility]  [phone] [address] [place] [address]

## 2021-08-11 NOTE — CARE COORDINATION
Case Management Assessment  Initial Evaluation      Patient Name: Abdoulaye Cross  YOB: 1950  Diagnosis: Atrial fibrillation with RVR ([address] [I48.91]  Date / Time: 8/10/2021 10:30 AM    Admission status/Date: INPT 8/10/21  Chart Reviewed: Yes      Patient Interviewed: Yes   Family Interviewed:  No      Hospitalization in the last 30 days:  No      Health Care Decision Maker : Luis Armando Sar, dtr: [phone]  Katiana Rivas, son: [phone]    (CM - must 1st enter selection under Navigator - emergency contact- [place] Relationship and pick relationship)   Who do you trust or have selected to make healthcare decisions for you      Met with: pt via telephone  Interview conducted  (bedside/phone):    Current PCP: -    Financial  Medicare  Precert required for SNF : Y, N          3 night stay required - Y, N    ADLS  Support Systems/Care Needs: Friends/Neighbors  Transportation: self    Meal Preparation: self    Housing  Living Arrangements: home alone  Steps: 3  Intent for return to present living arrangements: Yes  Identified Issues: poss anticoags at discharge    [address] with [address] : No Agency:(Services)  Type of Home Care Services: None  Passport/Waiver : No  :                      Phone Number:    Passport/Waiver Services: -          Durable Medical Equiptment   DME Provider: [facility]  Equipment: -  Walker___Cane___RTS___ BSC___Shower Chair___Hospital Bed___W/C____Other________  02 at ____Liter(s)---wears(frequency)_______ HHN ___ CPAP_X__ BiPap___   N/A____      Home O2 Use :  No    If No for home O2---if presently on O2 during hospitalization:  No  if yes CM to follow for potential DC O2 need  Informed of need for care provider to bring portable home O2 tank on day of discharge for nursing to connect prior to leaving:   Not Indicated  Verbalized agreement/Understanding:   Not Indicated    Community Service Affiliation  Dialysis:  No · Agency:  · Location:  · Dialysis Schedule:  · Phone:   · Fax: Other Community Services: (ex:PT/OT,Mental Health,Wound Clinic, Cardio/Pul [address])    DISCHARGE PLAN: Explained Case Management role/services. Reviewed chart and spoke with pt via telephone. Pt states that he lives at home alone and is IPTA. Pt states that he plans to return home at discharge and denies need for hhc or other services. CM following for poss home anticoags at discharge. Addendum 8/11/2021 1636; CM spoke with pt. Pt is staying for a stress test tomorrow (8/12). Pt is active with [facility]. He has an appt on 8/19/201 at [address] he NP. CM advise pt to take the AVS with him tot he appt to show them the medication and the bottle for the NP at the [place] to fill, as pt confirmed that he gets is medications filled at the [place] and the most it should cost is $8/month. CM informed Steve Tom RN.

## 2021-08-11 NOTE — CONSULTS
[address] [place] [address]  [phone]        Reason for Consultation/Chief Complaint: \"I have been having  irreg heart beat. Kayy Money \"    History of Present Illness: Shell Ruffin is a 72 y.o. patient who presented to the hospital with complaints of  irreg heart beat. He is patient at [facility] and suffers from MONICA- CPAP pressure 18cm formerly seen by Dr Grace Lewis, DM2, HBP,HLD,  CKD II. Incidentally found to have irreg heart beat during his outpatient check up and was sent to this hospital for evaluation. He denies prior heart disease. He denies chest pain. I have been asked to provide consultation regarding further management and testing. Past Medical History:   has a past medical history of Antral gastritis, Bronchitis chronic, CHF (congestive heart failure) ([address] Chronic abdominal pain, Chronic back pain, COPD, Diabetes mellitus ([address] Emphysema of lung ([address] GERD (gastroesophageal reflux disease), Headache(784.0), Hiatal hernia, Hyperlipidemia, Hypertension, Impotence, Meniere disease, MONICA (obstructive sleep apnea), Peripheral vascular disease ([address] Pulmonary nodule, Pulmonary nodule, Rash, Renal cyst, Squamous papilloma, Unspecified cerebral artery occlusion with cerebral infarction, and Vitamin D deficiency. Surgical History:   has a past surgical history that includes spirometry (11-2-2009); amputation; Upper gastrointestinal endoscopy (4/21/11 ); External ear surgery (3/29/2012); Endoscopy, colon, diagnostic; and Colonoscopy (9/27/2011). Social History:   reports that he quit smoking about 25 years ago. His smoking use included cigarettes. He has a 75.00 pack-year smoking history. He has never used smokeless tobacco. He reports that he does not drink alcohol and does not use drugs.      Family History:  family history includes Asthma in his daughter and maternal grandmother; Cancer in his daughter; Cancer (age of onset: 54) in his brother; Diabetes in his daughter and father; Emphysema in his father; Heart Disease in his father; Heart Failure in his father; Stroke (age of onset: [de-identified]) in his mother. Home Medications:  Were reviewed and are listed in nursing record. and/or listed below  Prior to Admission medications    Medication Sig Start Date End Date Taking? Authorizing Provider   amLODIPine (NORVASC) 10 MG tablet Take 10 mg by mouth daily   Yes Historical Provider, MD   losartan (COZAAR) 25 MG tablet Take 12.5 mg by mouth daily   Yes Historical Provider, MD   metoprolol tartrate (LOPRESSOR) 25 MG tablet Take 25 mg by mouth 2 times daily   Yes Historical Provider, MD   furosemide (LASIX) 40 MG tablet Take 40 mg by mouth daily   Yes Historical Provider, MD   budesonide (PULMICORT) 180 MCG/ACT AEPB inhaler Inhale 2 puffs into the lungs 2 times daily   Yes Historical Provider, MD   atorvastatin (LIPITOR) 10 MG tablet Take 10 mg by mouth daily   Yes Historical Provider, MD   glipiZIDE (GLUCOTROL) 5 MG tablet Take 5 mg by mouth 2 times daily (before meals) Take 2 tabs before breakfast and 1 before supper   Yes Historical Provider, MD   metFORMIN (GLUCOPHAGE) 1000 MG tablet Take 1,000 mg by mouth 2 times daily (with meals)   Yes Historical Provider, MD   SEMAGLUTIDE, 1 MG/DOSE, SC Inject 0.25 mg into the skin once a week   Yes Historical Provider, MD   glycerin-hypromellose- 0.2-0.2-1 % SOLN opthalmic solution Place 2 drops into both eyes 4 times daily   Yes Historical Provider, MD   omeprazole (PRILOSEC) 20 MG delayed release capsule Take 20 mg by mouth daily   Yes Historical Provider, MD   Cholecalciferol (VITAMIN D) 50 MCG (2000 UT) CAPS capsule Take 1 capsule by mouth daily   Yes Historical Provider, MD   meclizine (ANTIVERT) 25 MG CHEW Take 25 mg by mouth 3 times daily as needed    Historical Provider, MD   dicyclomine (BENTYL) 10 MG capsule Take 10 mg by mouth 4 times daily (before meals and nightly).       Historical Provider, MD        Allergies:  Patient has no known allergies. Review of Systems:   12 point ROS negative in all areas as listed below except as in [place]  Constitutional, EENT, GI, , Musculoskeletal, skin, neurological, hematological, endocrine, Psychiatric    Physical Examination:  /76 P 112/min.  R 20, T 97.7, Spo2 91% RA   Vitals:    08/11/21 0150   BP:    Pulse:    Resp: 18   Temp:    SpO2: 91%    Weight: 253 lb 9.6 oz (115 kg)       Obese   General Appearance:  Alert, cooperative, no distress, appears stated age   Head:  Normocephalic, without obvious abnormality, atraumatic   Eyes:  PERRL, conjunctiva/corneas clear       Nose: Nares normal, no drainage or sinus tenderness   Throat: Lips, mucosa, and tongue normal   Neck:  short obese, Supple, symmetrical, trachea midline, no adenopathy, thyroid: not enlarged, symmetric, no tenderness/mass/nodules, no carotid bruit or JVD       Lungs:   Clear to auscultation bilaterally, respirations unlabored   Chest Wall:  No tenderness or deformity   Heart:  irreg irreg S2 normal, no murmur, rub or gallop   Abdomen:   Soft, non-tender, bowel sounds active all four quadrants,  no masses, no organomegaly           Extremities: Extremities normal, atraumatic, no cyanosis or edema   Pulses: 2+ and symmetric   Skin: Skin color, texture, turgor normal, no rashes or lesions   Pysch: Normal mood and affect   Neurologic: Normal gross motor and sensory exam.         Labs  CBC:   Lab Results   Component Value Date    WBC 4.8 08/11/2021    RBC 4.35 08/11/2021    HGB 13.0 08/11/2021    HCT 40.2 08/11/2021    MCV 92.4 08/11/2021    RDW 13.3 08/11/2021     08/11/2021     CMP:    Lab Results   Component Value Date     08/11/2021    K 3.7 08/11/2021    K 4.1 08/10/2021     08/11/2021    CO2 27 08/11/2021    BUN 16 08/11/2021    CREATININE 1.2 08/11/2021    GFRAA >60 08/11/2021    GFRAA >60 08/15/2012    AGRATIO 1.3 08/10/2021    LABGLOM 60 08/11/2021    GLUCOSE 88 08/11/2021    PROT 7.8 08/10/2021    PROT 7.1 08/15/2012    CALCIUM 9.8 08/11/2021    BILITOT 0.5 08/10/2021    ALKPHOS 99 08/10/2021    AST 30 08/10/2021    ALT 22 08/10/2021     PT/INR:  No results found for: PTINR  Lab Results   Component Value Date    TROPONINI <0.01 08/10/2021   troponin x3    < 0.01  ProBNP 1166  Chest xray 8.10.21     FINDINGS:   Heart size and pulmonary vasculature within normal limits.  Thoracic aorta   tortuous.  Strandy opacities in the lung bases.  Lungs otherwise clear           Impression   1. No evidence of pulmonary edema   2. Bibasilar atelectasis             EKG:  I have reviewed EKG with the following interpretation:  Impression:  8.10.21  Atrial fibrillation with rapid ventricular responseRight bundle branch blockLeft anterior fascicular block Bifascicular block    Abnormal ECGPrevious EKG on 2.10.16 had sinus rhythm. Confirmed by Zack Perez MD, [address] ([id number]) on 8/11/2021 5:14:05 AM    2.10.16     Diagnosis  Normal sinus rhythmLeft axis deviationAbnormal ECGNo previous ECGs availableConfirmed by Cande Oliva MD, Antwan Granado ([id number]) on 2/10/2016 11:42:22 AM       Echo 3.21.12  1. Mildly dilated left atrium. 2.  The left ventricle is normal in size, normal wall thickness, and   overall normal global and regional systolic function. 3.  Mild tricuspid regurgitation. 4.  Mild mitral regurgitation. 5.  Mildly elevated pulmonary artery systolic pressure. Read ByJeny Gould M.D.      Assessment  Atrial fibrillations RVR duration unknown  MONICA on treatment  HBP  HLD  CKD II  Obesity   Former smoker Heavy  Patient Active Problem List   Diagnosis    Hypertension    Hyperlipidemia    GERD (gastroesophageal reflux disease)    COPD (chronic obstructive pulmonary disease) (HCC)    Vitamin D deficiency    Vertigo    Meniere disease    Depression    Chronic abdominal pain    Atrial fibrillation with RVR ([address]    Hypercalcemia    Type 2 diabetes mellitus without complication ([address]    CHF (congestive heart failure) ([address] Plan:  TSH  ECHO  Rate control  Anticoagulation  Follow up in office in 3 weeks  Consider cardioversion in 4 weeks    I had the opportunity to review the clinical symptoms and presentation of Melanie Mendes. I have discussed Rx plan with patient     I will address the patient's cardiac risk factors and adjusted pharmacologic treatment as needed. In addition, I have reinforced the need for patient directed risk factor modification. Tobacco use was discussed with the patient and educated on the negative effects. I have asked the patient to not utilize these agents. He no longer smokes and agrees not to start back. Thank you for allowing to us to participate in the care or Melanie Mendes. Further evaluation will be based upon the patient's clinical course and testing results. All questions and concerns were addressed to the patient/family. Alternatives to my treatment were discussed. The note was completed using EMR. Every effort was made to ensure accuracy; however, inadvertent computerized transcription errors may be present.

## 2021-08-11 NOTE — PROGRESS NOTES
Pt resting in bed watching TV. He denies any pain or SOB. Assessment complete-see flowsheet. Medications given-see MAR. Pt denies further needs, call light and bedside table within reach. Will continue to monitor.

## 2021-08-11 NOTE — PROGRESS NOTES
Beta Blocker held for stress test per hospitalist orders. Patient in Afib RVR and couldn't complete stress test. Hospitalist and cardiologist aware. Do not hold beta blocker for stress test tomorrow per cardiology. New order in and hold for stress order removed.

## 2021-08-11 NOTE — ACP (ADVANCE CARE PLANNING)
Advance Care Planning   Healthcare Decision Maker: Robert Freeman, dtr: [phone]  Gordon Lynch, son: [phone]      Click here to complete Healthcare Decision Makers including selection of the Healthcare Decision Maker Relationship (ie \"Primary\").

## 2021-08-11 NOTE — PROGRESS NOTES
08/10/21 2241   NIV Type   $NIV $Daily Charge   Skin Assessment Clean, dry, & intact   Skin Protection for O2 Device Yes   Location Nose   Equipment Type system one   Mode CPAP   Mask Type Full face mask   Mask Size Large   Settings/Measurements   CPAP/EPAP 18 cmH2O   Resp 20   Comfort Level Good   Using Accessory Muscles No   SpO2 90

## 2021-08-11 NOTE — PROGRESS NOTES
Shift assessment complete. See flow sheet. Scheduled meds held for NPO (stress test scheduled around 12pm). See MAR. Patients head-toe complete, VS are logged, active bowel sound noted in all four quadrants. No needs are noted at this time. Call light and bedside table are within reach. The bed is locked and is in the lowest position.

## 2021-08-11 NOTE — PLAN OF CARE
Problem: Falls - Risk of:  Goal: Will remain free from falls  Description: Will remain free from falls  8/11/2021 0416 by Sia Avendano RN  Outcome: Ongoing  8/11/2021 0412 by Sia Avendano RN  Outcome: Ongoing  Goal: Absence of physical injury  Description: Absence of physical injury  8/11/2021 0416 by Sia Avendano RN  Outcome: Ongoing  8/11/2021 0412 by Sia Avendano RN  Outcome: Ongoing     Problem: Cardiac:  Goal: Ability to maintain an adequate cardiac output will improve  Description: Ability to maintain an adequate cardiac output will improve  8/11/2021 0416 by Sia Avendano RN  Outcome: Ongoing  8/11/2021 0412 by Sia Avendano RN  Outcome: Ongoing  Goal: Hemodynamic stability will improve  Description: Hemodynamic stability will improve  8/11/2021 0416 by Sia Avendano RN  Outcome: Ongoing  8/11/2021 0412 by Sia Avendano RN  Outcome: Ongoing     Problem: Fluid Volume:  Goal: Ability to achieve and maintain adequate urine output will improve  Description: Ability to achieve and maintain adequate urine output will improve  8/11/2021 0416 by Sia Avendano RN  Outcome: Ongoing  8/11/2021 0412 by Sia Avendano RN  Outcome: Ongoing     Problem: Respiratory:  Goal: Respiratory status will improve  Description: Respiratory status will improve  8/11/2021 0416 by Sia Avendano RN  Outcome: Ongoing  8/11/2021 0412 by Sia Avendano RN  Outcome: Ongoing     Problem: OXYGENATION/RESPIRATORY FUNCTION  Goal: Patient will maintain patent airway  Outcome: Ongoing  Note:   Patient's EF (Ejection Fraction) is unavailable, Echo scheduled for 8/11           . .................................................................................................................................................. Patient's weights and intake/output reviewed: Yes    Patient's Last Weight: 253 lbs obtained by bed scale.  Difference of 5 lbs less than last documented weight. Intake/Output Summary (Last 24 hours) at 8/11/2021 0413  Last data filed at 8/11/2021 0140  Gross per 24 hour   Intake 402 ml   Output 400 ml   Net 2 ml       Comorbidities Reviewed Yes    Patient has a past medical history of Antral gastritis, Bronchitis chronic, CHF (congestive heart failure) ([facility] Chronic abdominal pain, Chronic back pain, COPD, Diabetes mellitus ([address] Emphysema of lung ([address] GERD (gastroesophageal reflux disease), Headache(784.0), Hiatal hernia, Hyperlipidemia, Hypertension, Impotence, Meniere disease, MONICA (obstructive sleep apnea), Peripheral vascular disease ([facility] [address] Pulmonary nodule, Pulmonary nodule, Rash, Renal cyst, Squamous papilloma, Unspecified cerebral artery occlusion with cerebral infarction, and Vitamin D deficiency. >>For CHF and Comorbidity documentation on Education Time and Topics, please see Education Tab                     Pt resting in bed at this time on room air. Pt denies shortness of breath. Pt with nonpitting lower extremity edema.      Patient and/or Family's stated Goal of Care this Admission: increase activity tolerance and be more comfortable prior to discharge        :   Goal: Patient will achieve/maintain normal respiratory rate/effort  Description: Respiratory rate and effort will be within normal limits for the patient  Outcome: Ongoing     Problem: HEMODYNAMIC STATUS  Goal: Patient has stable vital signs and fluid balance  Outcome: Ongoing     Problem: FLUID AND ELECTROLYTE IMBALANCE  Goal: Fluid and electrolyte balance are achieved/maintained  Outcome: Ongoing     Problem: ACTIVITY INTOLERANCE/IMPAIRED MOBILITY  Goal: Mobility/activity is maintained at optimum level for patient  Outcome: Ongoing

## 2021-08-12 ENCOUNTER — APPOINTMENT (OUTPATIENT)
Dept: NUCLEAR MEDICINE | Age: 71
DRG: 309 | End: 2021-08-12
Payer: OTHER GOVERNMENT

## 2021-08-12 LAB
GLUCOSE BLD-MCNC: 103 MG/DL (ref 70–99)
GLUCOSE BLD-MCNC: 112 MG/DL (ref 70–99)
GLUCOSE BLD-MCNC: 112 MG/DL (ref 70–99)
GLUCOSE BLD-MCNC: 77 MG/DL (ref 70–99)
LV EF: 39 %
LVEF MODALITY: NORMAL
PERFORMED ON: ABNORMAL
PERFORMED ON: NORMAL
SARS-COV-2, NAAT: NOT DETECTED

## 2021-08-12 PROCEDURE — 93017 CV STRESS TEST TRACING ONLY: CPT

## 2021-08-12 PROCEDURE — 2500000003 HC RX 250 WO HCPCS

## 2021-08-12 PROCEDURE — 6360000002 HC RX W HCPCS: Performed by: NURSE PRACTITIONER

## 2021-08-12 PROCEDURE — 94660 CPAP INITIATION&MGMT: CPT

## 2021-08-12 PROCEDURE — 99233 SBSQ HOSP IP/OBS HIGH 50: CPT | Performed by: INTERNAL MEDICINE

## 2021-08-12 PROCEDURE — 94761 N-INVAS EAR/PLS OXIMETRY MLT: CPT

## 2021-08-12 PROCEDURE — 6370000000 HC RX 637 (ALT 250 FOR IP): Performed by: INTERNAL MEDICINE

## 2021-08-12 PROCEDURE — 78452 HT MUSCLE IMAGE SPECT MULT: CPT

## 2021-08-12 PROCEDURE — 2580000003 HC RX 258: Performed by: INTERNAL MEDICINE

## 2021-08-12 PROCEDURE — 94640 AIRWAY INHALATION TREATMENT: CPT

## 2021-08-12 PROCEDURE — 6370000000 HC RX 637 (ALT 250 FOR IP): Performed by: NURSE PRACTITIONER

## 2021-08-12 PROCEDURE — 2580000003 HC RX 258: Performed by: NURSE PRACTITIONER

## 2021-08-12 PROCEDURE — A9502 TC99M TETROFOSMIN: HCPCS | Performed by: INTERNAL MEDICINE

## 2021-08-12 PROCEDURE — 87635 SARS-COV-2 COVID-19 AMP PRB: CPT

## 2021-08-12 PROCEDURE — 6360000002 HC RX W HCPCS: Performed by: INTERNAL MEDICINE

## 2021-08-12 PROCEDURE — 2060000000 HC ICU INTERMEDIATE R&B

## 2021-08-12 PROCEDURE — 3430000000 HC RX DIAGNOSTIC RADIOPHARMACEUTICAL: Performed by: INTERNAL MEDICINE

## 2021-08-12 RX ORDER — POLYETHYLENE GLYCOL 3350 17 G/17G
17 POWDER, FOR SOLUTION ORAL DAILY PRN
Status: CANCELLED | OUTPATIENT
Start: 2021-08-12

## 2021-08-12 RX ORDER — METOPROLOL TARTRATE 5 MG/5ML
2.5 INJECTION INTRAVENOUS ONCE
Status: COMPLETED | OUTPATIENT
Start: 2021-08-12 | End: 2021-08-12

## 2021-08-12 RX ORDER — SODIUM CHLORIDE 9 MG/ML
25 INJECTION, SOLUTION INTRAVENOUS PRN
Status: CANCELLED | OUTPATIENT
Start: 2021-08-12

## 2021-08-12 RX ORDER — ACETAMINOPHEN 650 MG/1
650 SUPPOSITORY RECTAL EVERY 6 HOURS PRN
Status: CANCELLED | OUTPATIENT
Start: 2021-08-12

## 2021-08-12 RX ORDER — ONDANSETRON 4 MG/1
4 TABLET, ORALLY DISINTEGRATING ORAL EVERY 8 HOURS PRN
Status: CANCELLED | OUTPATIENT
Start: 2021-08-12

## 2021-08-12 RX ORDER — METOPROLOL TARTRATE 5 MG/5ML
INJECTION INTRAVENOUS
Status: COMPLETED
Start: 2021-08-12 | End: 2021-08-12

## 2021-08-12 RX ORDER — DICYCLOMINE HYDROCHLORIDE 10 MG/1
10 CAPSULE ORAL
Status: CANCELLED | OUTPATIENT
Start: 2021-08-12

## 2021-08-12 RX ORDER — SODIUM CHLORIDE 0.9 % (FLUSH) 0.9 %
5-40 SYRINGE (ML) INJECTION PRN
Status: CANCELLED | OUTPATIENT
Start: 2021-08-12

## 2021-08-12 RX ORDER — FUROSEMIDE 40 MG/1
40 TABLET ORAL DAILY
Status: CANCELLED | OUTPATIENT
Start: 2021-08-13

## 2021-08-12 RX ORDER — SODIUM CHLORIDE 0.9 % (FLUSH) 0.9 %
5-40 SYRINGE (ML) INJECTION EVERY 12 HOURS SCHEDULED
Status: CANCELLED | OUTPATIENT
Start: 2021-08-12

## 2021-08-12 RX ORDER — SODIUM CHLORIDE 0.9 % (FLUSH) 0.9 %
5-40 SYRINGE (ML) INJECTION PRN
OUTPATIENT
Start: 2021-08-12

## 2021-08-12 RX ORDER — ATORVASTATIN CALCIUM 10 MG/1
10 TABLET, FILM COATED ORAL DAILY
Status: CANCELLED | OUTPATIENT
Start: 2021-08-13

## 2021-08-12 RX ORDER — DEXTROSE MONOHYDRATE 25 G/50ML
12.5 INJECTION, SOLUTION INTRAVENOUS PRN
Status: CANCELLED | OUTPATIENT
Start: 2021-08-12

## 2021-08-12 RX ORDER — SODIUM CHLORIDE 9 MG/ML
INJECTION, SOLUTION INTRAVENOUS CONTINUOUS
Status: DISCONTINUED | OUTPATIENT
Start: 2021-08-12 | End: 2021-08-13 | Stop reason: HOSPADM

## 2021-08-12 RX ORDER — METOPROLOL SUCCINATE 50 MG/1
100 TABLET, EXTENDED RELEASE ORAL DAILY
Status: CANCELLED | OUTPATIENT
Start: 2021-08-13

## 2021-08-12 RX ORDER — NICOTINE POLACRILEX 4 MG
15 LOZENGE BUCCAL PRN
Status: CANCELLED | OUTPATIENT
Start: 2021-08-12

## 2021-08-12 RX ORDER — METOPROLOL SUCCINATE 50 MG/1
50 TABLET, EXTENDED RELEASE ORAL ONCE
Status: COMPLETED | OUTPATIENT
Start: 2021-08-12 | End: 2021-08-12

## 2021-08-12 RX ORDER — CARBOXYMETHYLCELLULOSE SODIUM 10 MG/ML
2 GEL OPHTHALMIC 4 TIMES DAILY
Status: CANCELLED | OUTPATIENT
Start: 2021-08-12

## 2021-08-12 RX ORDER — METOPROLOL TARTRATE 5 MG/5ML
5 INJECTION INTRAVENOUS EVERY 4 HOURS PRN
Status: DISCONTINUED | OUTPATIENT
Start: 2021-08-12 | End: 2021-08-13 | Stop reason: HOSPADM

## 2021-08-12 RX ORDER — LOSARTAN POTASSIUM 25 MG/1
12.5 TABLET ORAL DAILY
Status: CANCELLED | OUTPATIENT
Start: 2021-08-13

## 2021-08-12 RX ORDER — PANTOPRAZOLE SODIUM 40 MG/1
40 TABLET, DELAYED RELEASE ORAL
Status: CANCELLED | OUTPATIENT
Start: 2021-08-13

## 2021-08-12 RX ORDER — SODIUM CHLORIDE 9 MG/ML
25 INJECTION, SOLUTION INTRAVENOUS PRN
OUTPATIENT
Start: 2021-08-12

## 2021-08-12 RX ORDER — FLUTICASONE PROPIONATE 110 UG/1
2 AEROSOL, METERED RESPIRATORY (INHALATION) 2 TIMES DAILY
Status: CANCELLED | OUTPATIENT
Start: 2021-08-12

## 2021-08-12 RX ORDER — METOPROLOL SUCCINATE 50 MG/1
100 TABLET, EXTENDED RELEASE ORAL DAILY
Status: DISCONTINUED | OUTPATIENT
Start: 2021-08-13 | End: 2021-08-13 | Stop reason: HOSPADM

## 2021-08-12 RX ORDER — ACETAMINOPHEN 325 MG/1
650 TABLET ORAL EVERY 6 HOURS PRN
Status: CANCELLED | OUTPATIENT
Start: 2021-08-12

## 2021-08-12 RX ORDER — VITAMIN B COMPLEX
2000 TABLET ORAL DAILY
Status: CANCELLED | OUTPATIENT
Start: 2021-08-13

## 2021-08-12 RX ORDER — SODIUM CHLORIDE 0.9 % (FLUSH) 0.9 %
5-40 SYRINGE (ML) INJECTION EVERY 12 HOURS SCHEDULED
OUTPATIENT
Start: 2021-08-12

## 2021-08-12 RX ORDER — ONDANSETRON 2 MG/ML
4 INJECTION INTRAMUSCULAR; INTRAVENOUS EVERY 6 HOURS PRN
Status: CANCELLED | OUTPATIENT
Start: 2021-08-12

## 2021-08-12 RX ORDER — SODIUM CHLORIDE 9 MG/ML
INJECTION, SOLUTION INTRAVENOUS CONTINUOUS
OUTPATIENT
Start: 2021-08-12

## 2021-08-12 RX ORDER — DEXTROSE MONOHYDRATE 50 MG/ML
100 INJECTION, SOLUTION INTRAVENOUS PRN
Status: CANCELLED | OUTPATIENT
Start: 2021-08-12

## 2021-08-12 RX ADMIN — SODIUM CHLORIDE: 9 INJECTION, SOLUTION INTRAVENOUS at 21:48

## 2021-08-12 RX ADMIN — ENOXAPARIN SODIUM 120 MG: 120 INJECTION SUBCUTANEOUS at 05:47

## 2021-08-12 RX ADMIN — Medication 10 ML: at 09:38

## 2021-08-12 RX ADMIN — METOPROLOL TARTRATE 2.5 MG: 5 INJECTION INTRAVENOUS at 09:37

## 2021-08-12 RX ADMIN — Medication 2 PUFF: at 19:41

## 2021-08-12 RX ADMIN — Medication 2 PUFF: at 07:29

## 2021-08-12 RX ADMIN — METOPROLOL SUCCINATE 50 MG: 50 TABLET, EXTENDED RELEASE ORAL at 10:51

## 2021-08-12 RX ADMIN — REGADENOSON 0.4 MG: 0.08 INJECTION, SOLUTION INTRAVENOUS at 12:03

## 2021-08-12 RX ADMIN — LOSARTAN POTASSIUM 12.5 MG: 25 TABLET, FILM COATED ORAL at 14:38

## 2021-08-12 RX ADMIN — ATORVASTATIN CALCIUM 10 MG: 10 TABLET, FILM COATED ORAL at 14:39

## 2021-08-12 RX ADMIN — TETROFOSMIN 10.7 MILLICURIE: 1.38 INJECTION, POWDER, LYOPHILIZED, FOR SOLUTION INTRAVENOUS at 10:43

## 2021-08-12 RX ADMIN — METOPROLOL SUCCINATE 50 MG: 50 TABLET, EXTENDED RELEASE ORAL at 07:04

## 2021-08-12 RX ADMIN — TETROFOSMIN 33.4 MILLICURIE: 1.38 INJECTION, POWDER, LYOPHILIZED, FOR SOLUTION INTRAVENOUS at 12:03

## 2021-08-12 NOTE — PROGRESS NOTES
Shift assessment complete, see flowsheets. Pts HR staying in 130s and as high as 160s. Bolus of cardizem given and started on gtt, see MAR. No other needs or discomforts stated at this time. Call light in easy reach, bedside table in easy reach, and bed in lowest position.   Gio Kern RN

## 2021-08-12 NOTE — PROGRESS NOTES
08/12/21 0236   NIV Type   Equipment Type RESPIRONICS   Mode CPAP   Mask Type Full face mask   Mask Size Large   Settings/Measurements   CPAP/EPAP 18 cmH2O   Resp 18   O2 Flow Rate (L/min) 6 L/min   Comfort Level Good   Using Accessory Muscles No   SpO2 92

## 2021-08-12 NOTE — PROGRESS NOTES
Shift assessment complete. See flow sheet. Patients head-toe complete, VS are logged, active bowel sound noted in all four quadrants. No needs are noted at this time. Call light and bedside table are within reach. The bed is locked and is in the lowest position.

## 2021-08-12 NOTE — PROGRESS NOTES
[facility] set up for an 830 am pickup to transport patient to [facility] 8-13-21. Transport set up 8-12-21 @1500.  Karine Sanders

## 2021-08-12 NOTE — PROGRESS NOTES
Per Dr. Jessa Chaves orders placed for transfer to [facility] for further interventional cardiac evaluation  with Dr. Fabian Thomas. Pt to arrive at [facility], Shift lead, MD, cath lab and unit secretary all aware.  Risks benefits and alternatives to transfer discussed by Dr. Jessa Chaves and pt [facility] [place] aware to call [facility] hospitalist for transfer report

## 2021-08-12 NOTE — PROGRESS NOTES
Patient educated and agreeable for Cardiac cath at [facility] tomorrow for positive stress test.   HOLD 4am LOVENOX.  NPO midnight.   Covid Swab sent to lab

## 2021-08-12 NOTE — PROGRESS NOTES
[address] Daily Progress Note      Admit Date:  8/10/2021    Subjective:  Mr. Bandar Wisdom is seen for afib  He has no complaints  Denies chest pain, shortness of breath, edema, dizziness, palpitations and syncope. Reason for Consultation/Chief Complaint: \"I have been having  irreg heart beat. Keli Vasquez \"     History of Present Illness: Perez Cuba is a 72 y.o. patient who presented to the hospital with complaints of  irreg heart beat. He is patient at [address] [place] [address] and suffers from MONICA- CPAP pressure 18cm formerly seen by Dr Laura Driscoll, DM2, HBP,HLD,  CKD II. Incidentally found to have irreg heart beat during his outpatient check up and was sent to this hospital for evaluation. He denies prior heart disease. He denies chest pain. I have been asked to provide consultation regarding further management and testing.       ROS:  12 point ROS negative in all areas as listed below except as in [place]  Constitutional, EENT,  GI, , Musculoskeletal, skin, neurological, hematological, endocrine, Psychiatric    Past Medical History:   Diagnosis Date    Antral gastritis 11    with possible gastroparesis    Bronchitis chronic     CHF (congestive heart failure) (HCC)     Chronic abdominal pain     saw Dr. Etienne Phillips on 12; EGD, col., CT, RUQ U/S, and HIDA unrem.  Chronic back pain     COPD     PFT's show no obstr. or restr. defect    Diabetes mellitus ([address]     Emphysema of lung ([address]     mild,, bullous    GERD (gastroesophageal reflux disease)     Headache(784.0)     Hiatal hernia 11    Hyperlipidemia     Neg. Cor. Angio. (per 11 o.v. note) & neg. . Echo with Myoview (per 2/25/10 note)    Hypertension     ?     Impotence     Meniere disease     MONICA (obstructive sleep apnea) 10/2/2010    severe; AHI-47; on CPAP pressure of 18    Peripheral vascular disease (HCC)     Pulmonary nodule     Pulmonary nodule     Rash     Renal cyst     small, 1 on each kidney per CT Abd. 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 Hank Cisneros cerebral artery occlusion with cerebral infarction     Vitamin D deficiency 2/27/2012    6 ng/mL     Past Surgical History:   Procedure Laterality Date    AMPUTATION      right index    COLONOSCOPY  9/27/2011    ENDOSCOPY, COLON, DIAGNOSTIC      EXTERNAL EAR SURGERY  3/29/2012    Dr. Job Burgos; benign squamous papilloma    SPIROMETRY  11-2-2009    Total lung capacity improvement over 2007 PFT    UPPER GASTROINTESTINAL ENDOSCOPY  4/21/11     Dr. Rosemary Doshi; antral gastritis, hiatal hernia, & possible gastroparesis       Objective:   /80   Pulse 100   Temp 98.1 °F (36.7 °C) (Oral)   Resp 18   Ht 6' (1.829 m)   Wt 254 lb 8 oz (115.4 kg)   SpO2 91%   BMI 34.52 kg/m²       Intake/Output Summary (Last 24 hours) at 8/12/2021 0957  Last data filed at 8/12/2021 3495  Gross per 24 hour   Intake 875 ml   Output 1650 ml   Net -775 ml       TELEMETRY:  afib RVR    Physical Exam:  Obese   General: No Respiratory distress, appears well developed and well nourished. Eyes:  Sclera nonicteric  Nose/Sinuses:  negative findings: nose shows no deformity, asymmetry, or inflammation, nasal mucosa normal, septum midline with no perforation or bleeding  Back:  no pain to palpation  Joint:  no active joint inflammation  Musculoskeletal:  negative  Skin:  Warm and dry  Neck:  Negative for JVD and Carotid Bruits. Chest:  Clear to auscultation, respiration easy  Cardiovascular:  irreg irreg S2 normal, no murmur, no rub or thrill.   Abdomen:  Soft normal liver and spleen  Extremities:   No edema, clubbing, cyanosis,  Neuro: intact    Medications:    [START ON 8/13/2021] metoprolol succinate  100 mg Oral Daily    atorvastatin  10 mg Oral Daily    fluticasone  2 puff Inhalation BID    Vitamin D  2,000 Units Oral Daily    dicyclomine  10 mg Oral 4x Daily AC & HS    furosemide  40 mg Oral Daily    carboxymethylcellulose PF  2 drop Both Eyes 4x Daily    losartan  12.5 mg Oral Daily    pantoprazole  40 mg Oral QAM AC    sodium chloride flush  5-40 mL Intravenous 2 times per day    enoxaparin  1 mg/kg Subcutaneous Q12H    insulin lispro  0-6 Units Subcutaneous TID WC    insulin lispro  0-3 Units Subcutaneous Nightly      sodium chloride      dextrose       regadenoson, Combivent, sodium chloride flush, sodium chloride, ondansetron **OR** ondansetron, polyethylene glycol, acetaminophen **OR** acetaminophen, perflutren lipid microspheres, glucose, dextrose, glucagon (rDNA), dextrose    Lab Data:  CBC:   Recent Labs     08/10/21  1037 08/11/21  0438   WBC 6.9 4.8   HGB 13.9 13.0*   HCT 42.7 40.2*   MCV 91.9 92.4    177     BMP:   Recent Labs     08/10/21  1037 08/11/21  0438    139   K 4.1 3.7    101   CO2 25 27   BUN 14 16   CREATININE 1.2 1.2     LIVER PROFILE:   Recent Labs     08/10/21  1037   AST 30   ALT 22   BILITOT 0.5   ALKPHOS 99     PT/INR:   Recent Labs     08/10/21  1037   PROTIME 12.6   INR 1.10     APTT:   Recent Labs     08/10/21  1037   APTT 32.5     BNP:  No results for input(s): BNP in the last 72 hours. IMAGING:   TROPONINI <0.01 08/10/2021   troponin x3    < 0.01  ProBNP 1166  Echo doppler of heart 8.11.21   Summary   Left ventricular systolic function is mildly reduced to 45%. There is mild global hypokinesis. Left ventricular size is mildly increased. There is mild concentric left ventricular hypertrophy. Normal left ventricular diastolic filling pressure. No evidence of mitral valve stenosis. Moderate mitral regurgitation. The left atrium is mildly dilated. The aortic root is mildly dilated at 4.1cm. Moderate tricuspid regurgitation. Systolic pulmonary artery pressure (SPAP) estimated at 47 mmHg (RA pressure   15 mmHg), consistent with mild pulmonary hypertension. The right atrium is mildly dilated.       Signature ------------------------------------------------------------------   Electronically signed by Quinn Simon MD, [facility] (Interpreting   physician) on 08/11/2021 at 12:06 PM   ------------------------------------------------------------------    Chest xray 8.10.21      FINDINGS:   Heart size and pulmonary vasculature within normal limits.  Thoracic aorta   tortuous.  Strandy opacities in the lung bases.  Lungs otherwise clear           Impression   1. No evidence of pulmonary edema   2. Bibasilar atelectasis                EKG:  I have reviewed EKG with the following interpretation:  Impression:  8.10.21  Atrial fibrillation with rapid ventricular responseRight bundle branch blockLeft anterior fascicular block Bifascicular block    Abnormal ECGPrevious EKG on 2.10.16 had sinus rhythm. Confirmed by Shannan Miles MD, [address] ([id number]) on 8/11/2021 5:14:05 AM     2.10.16       Diagnosis   Normal sinus rhythmLeft axis deviationAbnormal ECGNo previous ECGs availableConfirmed by Daya Bernal MD, Patricia Barahona ([id number]) on 2/10/2016 11:42:22 AM        Assessment:  Atrial fib RVR  Mild systolic dysfunction- cardiomyopathy  Essential hypertension  Hyperlipidemia    Patient Active Problem List    Diagnosis Date Noted    CHF [facility]congestive heart failure) ([facility] 08/11/2021    MONICA on CPAP     Atrial fibrillation with RVR ([facility] 08/10/2021    Hypercalcemia     Type 2 diabetes mellitus without complication ([facility]     Depression 10/05/2012    Chronic abdominal pain 10/05/2012    Vertigo 07/20/2012    Meniere disease 07/20/2012    Vitamin D deficiency 02/27/2012    Hypertension 06/25/2010    Hyperlipidemia 06/25/2010    GERD (gastroesophageal reflux disease) 06/25/2010    COPD (chronic obstructive pulmonary disease) ([facility] 06/25/2010       Plan:  1. Continue toprol increase dose for afib  2. On lasix and ARB  3.  statin preventive  4.  Stress test today     Core Measures:  · Discharge instructions:   · LVEF documented:   · ACEI for LV dysfunction: · Smoking Cessation:    Will Peterson MD, MD 8/12/2021 9:57 AM

## 2021-08-12 NOTE — PROGRESS NOTES
Progress Note    Admit Date:  8/10/2021    Subjective:  Mr. Angie York was sent to the emergency room after he saw his primary care provider who found him in rapid A. fib. He has no history of A. fib but patient is asymptomatic yesterday so is unsure if he has had it before. He does have sleep apnea. He also has a history of hypertension, diabetes mellitus type 2 and congestive heart failure. He is unsure if he has had an echo before. Denies any history of CAD. He is still in a fib. His HR is controlled. 8/12- patient had periods of rapid a fib yesterday. His stress test was cancelled due to tachycardia. This am his HR is improved. He did receive a bolus of Cardizem and was on a drip last night. His HR dropped to the 50 and the drip was stopped. He denies any new complaints. His ECHO showed decreased EF. Objective:   /80   Pulse 110   Temp 98.1 °F (36.7 °C) (Oral)   Resp 18   Ht 6' (1.829 m)   Wt 254 lb 8 oz (115.4 kg)   SpO2 94%   BMI 34.52 kg/m²          Intake/Output Summary (Last 24 hours) at 8/12/2021 0726  Last data filed at 8/12/2021 0504  Gross per 24 hour   Intake 875 ml   Output 1550 ml   Net -675 ml       Physical Exam:    General appearance: alert, appears stated age and cooperative  Head: Normocephalic, without obvious abnormality, atraumatic  Eyes: conjunctivae/corneas clear. PERRL, EOM's intact.   Neck: no adenopathy, no carotid bruit, no JVD, supple, symmetrical, trachea midline and thyroid not enlarged, symmetric, no tenderness/mass/nodules  Lungs: diminished BS but no wheezes or rhonchi or crackles  Heart: irregular rate and rhythm, S1, S2 normal, + murmur, no click, rub or gallop  Abdomen: soft, non-tender; bowel sounds normal; no masses,  no organomegaly  Extremities: extremities normal, atraumatic, no cyanosis or edema  Pulses: 2+ and symmetric  Skin: Skin color, texture, turgor normal. No rashes or lesions  Neurologic: Grossly normal    Scheduled Meds:   metoprolol succinate  50 mg Oral Daily    atorvastatin  10 mg Oral Daily    fluticasone  2 puff Inhalation BID    Vitamin D  2,000 Units Oral Daily    dicyclomine  10 mg Oral 4x Daily AC & HS    furosemide  40 mg Oral Daily    carboxymethylcellulose PF  2 drop Both Eyes 4x Daily    losartan  12.5 mg Oral Daily    pantoprazole  40 mg Oral QAM AC    sodium chloride flush  5-40 mL Intravenous 2 times per day    enoxaparin  1 mg/kg Subcutaneous Q12H    insulin lispro  0-6 Units Subcutaneous TID WC    insulin lispro  0-3 Units Subcutaneous Nightly       Continuous Infusions:   dilTIAZem (CARDIZEM) 125 mg in dextrose 5% 125 mL infusion Stopped (08/11/21 2322)    sodium chloride      dextrose         PRN Meds:  regadenoson, Combivent, sodium chloride flush, sodium chloride, ondansetron **OR** ondansetron, polyethylene glycol, acetaminophen **OR** acetaminophen, perflutren lipid microspheres, glucose, dextrose, glucagon (rDNA), dextrose      Data:  CBC:   Recent Labs     08/10/21  1037 08/11/21  0438   WBC 6.9 4.8   HGB 13.9 13.0*   HCT 42.7 40.2*   MCV 91.9 92.4    177     BMP:   Recent Labs     08/10/21  1037 08/11/21  0438    139   K 4.1 3.7    101   CO2 25 27   BUN 14 16   CREATININE 1.2 1.2     LIVER PROFILE:   Recent Labs     08/10/21  1037   AST 30   ALT 22   BILITOT 0.5   ALKPHOS 99     PT/INR:   Recent Labs     08/10/21  1037   PROTIME 12.6   INR 1.10   Results for Cherry Arguelles (MRN [id number]) as of 8/12/2021 07:06   Ref.  Range 8/10/2021 10:37 8/10/2021 17:39 8/10/2021 21:23   Pro-BNP Latest Ref Range: 0 - 124 pg/mL 1,166 (H)     Troponin Latest Ref Range: <0.01 ng/mL <0.01 <0.01 <0.01     Cultures:  None      [id number] Score for Atrial Fibrillation Stroke Risk   Risk   Factors  Component Value   C CHF Yes 1   H HTN Yes 1   A2 Age >= 75 No,  (76 y.o.) 0   D DM Yes 1   S2 Prior Stroke/TIA No 0   V Vascular Disease No 0   A Age 74-69 Yes,  (76 y.o.) 1   Sc Sex male 0 [id number]  Score  4   Score last updated 8/11/21 6:50 AM EDT    Click here for a link to the UpToDate guideline \"Atrial Fibrillation: Anticoagulation therapy to prevent embolization    Disclaimer: Risk Score calculation is dependent on accuracy of patient problem list and past encounter diagnosis. XR CHEST PORTABLE   Final Result   1. No evidence of pulmonary edema   2. Bibasilar atelectasis         NM Cardiac Stress Test Nuclear Imaging    (Results Pending)       ECHO 8/11/21   Conclusions      Summary   3/21/2012 Normal EF, LAE, mild MR, TR SPAP 40.5 mmHg. Left ventricular systolic function is mildly reduced to 45%. There is mild global hypokinesis. Left ventricular size is mildly increased. There is mild concentric left ventricular hypertrophy. Normal left ventricular diastolic filling pressure. No evidence of mitral valve stenosis. Moderate mitral regurgitation. The left atrium is mildly dilated. The aortic root is mildly dilated at 4.1cm. Moderate tricuspid regurgitation. Systolic pulmonary artery pressure (SPAP) estimated at 47 mmHg (RA pressure   15 mmHg), consistent with mild pulmonary hypertension. The right atrium is mildly dilated. Signature      ------------------------------------------------------------------   Electronically signed by Bebe Farrell MD, [facility] (Interpreting   physician) on 08/11/2021 at 12:06 PM   ------------------------------------------------------------------        Assessment:  Principal Problem:    Atrial fibrillation with RVR ([facility]  Active Problems:    Hypertension    Hyperlipidemia    COPD (chronic obstructive pulmonary disease) ([facility]    Hypercalcemia    Type 2 diabetes mellitus without complication (HCC)    CHF (congestive heart failure) (HCC)    MONICA on CPAP  Resolved Problems:    * No resolved hospital problems.  *      Plan:    New Onset Atrial fibrillation with RVR   - HR in the 140's on arrival to the ER   -  Given diltiazem bolus and started on gtt. On Toprol XL now.   - had episodes of rapid a fib. - Tele monitor   - EKG with bifascicular block   - Troponin negaitve x 3, trend  - TSH pending   - [id number]: 4, AC with full dose Lovenox for now for PPx coverage   - Echo shows EF is diminished  - Cardiology consulted  - stress test ordered-Lexiscan Myoview     Hypercalcemia resolved  - Mild, 10.7-->9.8       HLD  - Continue statin      HTN  - BP is controlled   - Continue Cozaar, BB  -Stop Norvasc. On PO Toprol XL. Chronic CHF -systolic  - ECHO noted.  Not in acute HF     COPD   - No AE   - Continue home inhalers PRN      DM2  - hold home oral Rx   - Continue SSI   - BG is well controlled      Menieres Disease  - stable            DVT Prophylaxis: Full dose Lovenox   Diet: NPO  Code Status: Prior    Lady Kaushik MD, MD 8/12/2021 7:26 AM

## 2021-08-12 NOTE — PROGRESS NOTES
Heart rate a fib climbing to 130-150. Perfect serve sent to MD. Awaiting response. Report given to Ernesto Marion and MD and Funmilayo Ross RN aware.

## 2021-08-12 NOTE — PROGRESS NOTES
Daily 50 mg dose Toprol XL given as ordered. HR still afib 130's. 2.5 IV push metoprolol given per orders. Patient still in 130's, 50 mg additional PO metoprolol given per orders. Sent down for stress test per Dr. Jovanni Valenzuela.

## 2021-08-13 ENCOUNTER — HOSPITAL ENCOUNTER (OUTPATIENT)
Dept: CARDIAC CATH/INVASIVE PROCEDURES | Age: 71
Discharge: HOME | DRG: 287 | End: 2021-08-13
Attending: INTERNAL MEDICINE | Admitting: INTERNAL MEDICINE
Payer: OTHER GOVERNMENT

## 2021-08-13 VITALS
SYSTOLIC BLOOD PRESSURE: 136 MMHG | HEART RATE: 123 BPM | BODY MASS INDEX: 35.21 KG/M2 | OXYGEN SATURATION: 93 % | TEMPERATURE: 97.1 F | HEIGHT: 72 IN | WEIGHT: 260 LBS | RESPIRATION RATE: 16 BRPM | DIASTOLIC BLOOD PRESSURE: 84 MMHG

## 2021-08-13 VITALS — HEIGHT: 72 IN | BODY MASS INDEX: 35.21 KG/M2 | WEIGHT: 260 LBS

## 2021-08-13 PROBLEM — R07.9 CHEST PAIN: Status: ACTIVE | Noted: 2021-08-13

## 2021-08-13 LAB
GLUCOSE BLD-MCNC: 104 MG/DL (ref 70–99)
GLUCOSE BLD-MCNC: 97 MG/DL (ref 70–99)
PERFORMED ON: ABNORMAL
PERFORMED ON: NORMAL

## 2021-08-13 PROCEDURE — C1887 CATHETER, GUIDING: HCPCS

## 2021-08-13 PROCEDURE — 2500000003 HC RX 250 WO HCPCS

## 2021-08-13 PROCEDURE — 6370000000 HC RX 637 (ALT 250 FOR IP): Performed by: INTERNAL MEDICINE

## 2021-08-13 PROCEDURE — 93458 L HRT ARTERY/VENTRICLE ANGIO: CPT | Performed by: INTERNAL MEDICINE

## 2021-08-13 PROCEDURE — 6370000000 HC RX 637 (ALT 250 FOR IP)

## 2021-08-13 PROCEDURE — 6360000004 HC RX CONTRAST MEDICATION

## 2021-08-13 PROCEDURE — 94640 AIRWAY INHALATION TREATMENT: CPT

## 2021-08-13 PROCEDURE — 99239 HOSP IP/OBS DSCHRG MGMT >30: CPT | Performed by: INTERNAL MEDICINE

## 2021-08-13 PROCEDURE — C1894 INTRO/SHEATH, NON-LASER: HCPCS

## 2021-08-13 PROCEDURE — 6360000002 HC RX W HCPCS

## 2021-08-13 PROCEDURE — 93454 CORONARY ARTERY ANGIO S&I: CPT

## 2021-08-13 PROCEDURE — 6370000000 HC RX 637 (ALT 250 FOR IP): Performed by: NURSE PRACTITIONER

## 2021-08-13 PROCEDURE — 2709999900 HC NON-CHARGEABLE SUPPLY

## 2021-08-13 PROCEDURE — C1769 GUIDE WIRE: HCPCS

## 2021-08-13 RX ORDER — ONDANSETRON 2 MG/ML
4 INJECTION INTRAMUSCULAR; INTRAVENOUS EVERY 6 HOURS PRN
Status: DISCONTINUED | OUTPATIENT
Start: 2021-08-13 | End: 2021-08-13 | Stop reason: HOSPADM

## 2021-08-13 RX ORDER — ATORVASTATIN CALCIUM 40 MG/1
40 TABLET, FILM COATED ORAL DAILY
Qty: 90 TABLET | Refills: 3 | Status: SHIPPED | OUTPATIENT
Start: 2021-08-13

## 2021-08-13 RX ORDER — LOSARTAN POTASSIUM 25 MG/1
12.5 TABLET ORAL DAILY
Status: DISCONTINUED | OUTPATIENT
Start: 2021-08-13 | End: 2021-08-13 | Stop reason: HOSPADM

## 2021-08-13 RX ORDER — SODIUM CHLORIDE 9 MG/ML
25 INJECTION, SOLUTION INTRAVENOUS PRN
Status: DISCONTINUED | OUTPATIENT
Start: 2021-08-13 | End: 2021-08-13 | Stop reason: SDUPTHER

## 2021-08-13 RX ORDER — MIDAZOLAM HYDROCHLORIDE 5 MG/ML
INJECTION INTRAMUSCULAR; INTRAVENOUS
Status: COMPLETED | OUTPATIENT
Start: 2021-08-13 | End: 2021-08-13

## 2021-08-13 RX ORDER — HEPARIN SODIUM 1000 [USP'U]/ML
INJECTION, SOLUTION INTRAVENOUS; SUBCUTANEOUS
Status: COMPLETED | OUTPATIENT
Start: 2021-08-13 | End: 2021-08-13

## 2021-08-13 RX ORDER — NICOTINE POLACRILEX 4 MG
15 LOZENGE BUCCAL PRN
Status: DISCONTINUED | OUTPATIENT
Start: 2021-08-13 | End: 2021-08-13 | Stop reason: HOSPADM

## 2021-08-13 RX ORDER — ACETAMINOPHEN 650 MG/1
650 SUPPOSITORY RECTAL EVERY 6 HOURS PRN
Status: DISCONTINUED | OUTPATIENT
Start: 2021-08-13 | End: 2021-08-13 | Stop reason: HOSPADM

## 2021-08-13 RX ORDER — ASPIRIN 81 MG/1
81 TABLET ORAL DAILY
Qty: 90 TABLET | Refills: 1 | Status: SHIPPED | OUTPATIENT
Start: 2021-08-13 | End: 2021-08-18

## 2021-08-13 RX ORDER — SODIUM CHLORIDE 9 MG/ML
25 INJECTION, SOLUTION INTRAVENOUS PRN
Status: DISCONTINUED | OUTPATIENT
Start: 2021-08-13 | End: 2021-08-13 | Stop reason: HOSPADM

## 2021-08-13 RX ORDER — SODIUM CHLORIDE 0.9 % (FLUSH) 0.9 %
5-40 SYRINGE (ML) INJECTION EVERY 12 HOURS SCHEDULED
Status: DISCONTINUED | OUTPATIENT
Start: 2021-08-13 | End: 2021-08-13 | Stop reason: HOSPADM

## 2021-08-13 RX ORDER — MULTIVIT-MIN/IRON/FOLIC ACID/K 18-600-40
1 CAPSULE ORAL DAILY
Status: DISCONTINUED | OUTPATIENT
Start: 2021-08-13 | End: 2021-08-13 | Stop reason: HOSPADM

## 2021-08-13 RX ORDER — METOPROLOL SUCCINATE 50 MG/1
50 TABLET, EXTENDED RELEASE ORAL DAILY
Qty: 90 TABLET | Refills: 1 | Status: ON HOLD | OUTPATIENT
Start: 2021-08-13 | End: 2021-08-20 | Stop reason: SDUPTHER

## 2021-08-13 RX ORDER — POTASSIUM CHLORIDE 7.45 MG/ML
10 INJECTION INTRAVENOUS PRN
Status: DISCONTINUED | OUTPATIENT
Start: 2021-08-13 | End: 2021-08-13 | Stop reason: HOSPADM

## 2021-08-13 RX ORDER — SODIUM CHLORIDE 0.9 % (FLUSH) 0.9 %
10 SYRINGE (ML) INJECTION PRN
Status: DISCONTINUED | OUTPATIENT
Start: 2021-08-13 | End: 2021-08-13 | Stop reason: SDUPTHER

## 2021-08-13 RX ORDER — MAGNESIUM SULFATE IN WATER 40 MG/ML
2000 INJECTION, SOLUTION INTRAVENOUS PRN
Status: DISCONTINUED | OUTPATIENT
Start: 2021-08-13 | End: 2021-08-13 | Stop reason: HOSPADM

## 2021-08-13 RX ORDER — ONDANSETRON 4 MG/1
4 TABLET, ORALLY DISINTEGRATING ORAL EVERY 8 HOURS PRN
Status: DISCONTINUED | OUTPATIENT
Start: 2021-08-13 | End: 2021-08-13 | Stop reason: HOSPADM

## 2021-08-13 RX ORDER — FENTANYL CITRATE 50 UG/ML
INJECTION, SOLUTION INTRAMUSCULAR; INTRAVENOUS
Status: COMPLETED | OUTPATIENT
Start: 2021-08-13 | End: 2021-08-13

## 2021-08-13 RX ORDER — FUROSEMIDE 40 MG/1
40 TABLET ORAL DAILY
Qty: 60 TABLET | Refills: 3 | Status: ON HOLD | OUTPATIENT
Start: 2021-08-13 | End: 2021-08-20 | Stop reason: SDUPTHER

## 2021-08-13 RX ORDER — DEXTROSE MONOHYDRATE 25 G/50ML
12.5 INJECTION, SOLUTION INTRAVENOUS PRN
Status: DISCONTINUED | OUTPATIENT
Start: 2021-08-13 | End: 2021-08-13 | Stop reason: HOSPADM

## 2021-08-13 RX ORDER — SODIUM CHLORIDE 0.9 % (FLUSH) 0.9 %
10 SYRINGE (ML) INJECTION EVERY 12 HOURS SCHEDULED
Status: DISCONTINUED | OUTPATIENT
Start: 2021-08-13 | End: 2021-08-13 | Stop reason: SDUPTHER

## 2021-08-13 RX ORDER — LOSARTAN POTASSIUM 25 MG/1
12.5 TABLET ORAL DAILY
Qty: 30 TABLET | Refills: 2 | Status: SHIPPED | OUTPATIENT
Start: 2021-08-13

## 2021-08-13 RX ORDER — ATORVASTATIN CALCIUM 10 MG/1
10 TABLET, FILM COATED ORAL DAILY
Status: DISCONTINUED | OUTPATIENT
Start: 2021-08-13 | End: 2021-08-13 | Stop reason: HOSPADM

## 2021-08-13 RX ORDER — METOPROLOL TARTRATE 5 MG/5ML
INJECTION INTRAVENOUS
Status: COMPLETED | OUTPATIENT
Start: 2021-08-13 | End: 2021-08-13

## 2021-08-13 RX ORDER — DEXTROSE MONOHYDRATE 50 MG/ML
100 INJECTION, SOLUTION INTRAVENOUS PRN
Status: DISCONTINUED | OUTPATIENT
Start: 2021-08-13 | End: 2021-08-13 | Stop reason: HOSPADM

## 2021-08-13 RX ORDER — MECLIZINE HYDROCHLORIDE CHEWABLE TABLETS 25 MG/1
25 TABLET, CHEWABLE ORAL 3 TIMES DAILY PRN
Status: DISCONTINUED | OUTPATIENT
Start: 2021-08-13 | End: 2021-08-13 | Stop reason: HOSPADM

## 2021-08-13 RX ORDER — ACETAMINOPHEN 325 MG/1
650 TABLET ORAL EVERY 6 HOURS PRN
Status: DISCONTINUED | OUTPATIENT
Start: 2021-08-13 | End: 2021-08-13 | Stop reason: HOSPADM

## 2021-08-13 RX ORDER — ACETAMINOPHEN 325 MG/1
650 TABLET ORAL EVERY 4 HOURS PRN
Status: DISCONTINUED | OUTPATIENT
Start: 2021-08-13 | End: 2021-08-13 | Stop reason: SDUPTHER

## 2021-08-13 RX ORDER — SODIUM CHLORIDE 0.9 % (FLUSH) 0.9 %
5-40 SYRINGE (ML) INJECTION PRN
Status: DISCONTINUED | OUTPATIENT
Start: 2021-08-13 | End: 2021-08-13 | Stop reason: HOSPADM

## 2021-08-13 RX ORDER — AMLODIPINE BESYLATE 5 MG/1
10 TABLET ORAL DAILY
Status: DISCONTINUED | OUTPATIENT
Start: 2021-08-13 | End: 2021-08-13 | Stop reason: HOSPADM

## 2021-08-13 RX ORDER — ASPIRIN 325 MG
325 TABLET ORAL ONCE
Status: COMPLETED | OUTPATIENT
Start: 2021-08-13 | End: 2021-08-13

## 2021-08-13 RX ORDER — PANTOPRAZOLE SODIUM 40 MG/1
40 TABLET, DELAYED RELEASE ORAL
Status: DISCONTINUED | OUTPATIENT
Start: 2021-08-14 | End: 2021-08-13 | Stop reason: HOSPADM

## 2021-08-13 RX ADMIN — FENTANYL CITRATE 50 MCG: 50 INJECTION, SOLUTION INTRAMUSCULAR; INTRAVENOUS at 13:38

## 2021-08-13 RX ADMIN — Medication 2 PUFF: at 07:21

## 2021-08-13 RX ADMIN — MIDAZOLAM HYDROCHLORIDE 2 MG: 5 INJECTION INTRAMUSCULAR; INTRAVENOUS at 13:38

## 2021-08-13 RX ADMIN — ATORVASTATIN CALCIUM 10 MG: 10 TABLET, FILM COATED ORAL at 08:28

## 2021-08-13 RX ADMIN — Medication 325 MG: at 09:44

## 2021-08-13 RX ADMIN — METOPROLOL TARTRATE 5 MG: 5 INJECTION INTRAVENOUS at 13:39

## 2021-08-13 RX ADMIN — HEPARIN SODIUM 5000 UNITS: 1000 INJECTION, SOLUTION INTRAVENOUS; SUBCUTANEOUS at 13:40

## 2021-08-13 RX ADMIN — LOSARTAN POTASSIUM 12.5 MG: 25 TABLET, FILM COATED ORAL at 08:28

## 2021-08-13 RX ADMIN — METOPROLOL SUCCINATE 100 MG: 50 TABLET, EXTENDED RELEASE ORAL at 08:28

## 2021-08-13 RX ADMIN — Medication 2000 UNITS: at 08:28

## 2021-08-13 NOTE — DISCHARGE SUMMARY
Name:  Andres Archer  Room:  /6679-69  MRN:    [id number]    Discharge Summary      This discharge summary is in conjunction with a complete physical exam done on the day of discharge. Discharging Physician: Elridge Rubinstein, MD       Admit: 8/10/2021  Discharge:   8/13/2021     Diagnoses this Admission    Principal Problem:    Atrial fibrillation with RVR ([facility]  Active Problems:    Hypertension    Hyperlipidemia    COPD (chronic obstructive pulmonary disease) (HCC)    Hypercalcemia    Type 2 diabetes mellitus without complication (HCC)    CHF (congestive heart failure) (HCC)    MONICA on CPAP    Dilated cardiomyopathy ([facility]  Resolved Problems:    * No resolved hospital problems. *          Procedures (Please Review Full Report for Details)  none      Consults    Cardiology    HPI:    The patient is a 72 y.o. male with CHF, chronic abdominal pain, COPD, DM, GERD, HLD, HTN who presented to [facility] ED with complaint of irregular heart beat. Patient reports he is following up with the [place] clinic just for regular checkup when he was noted to be in A. fib with RVR. Patient states he is never been told that he has had this problem in the past.  He has a history of CHF and takes Lasix but otherwise has never been told any as there issues with his heart. He states he was otherwise feeling fine. He is always short of breath but did not notice anything new. He states the only thing that was different is that about a month ago he did have some right-sided chest pain and has some continued soreness to the right chest wall but states that it has gotten better.   He denies any associated fevers or chills, palpitations, dizziness or lightheadedness      Physical Exam at Discharge:  /84   Pulse 96   Temp 97 °F (36.1 °C) (Axillary)   Resp 18   Ht 6' (1.829 m)   Wt 260 lb (117.9 kg)   SpO2 94%   BMI 35.26 kg/m²        General appearance: alert, appears stated age and cooperative  Head: Normocephalic, without obvious abnormality, atraumatic  Eyes: conjunctivae/corneas clear. PERRL, EOM's intact. Neck: no adenopathy, no carotid bruit, no JVD, supple, symmetrical, trachea midline and thyroid not enlarged, symmetric, no tenderness/mass/nodules  Lungs: diminished BS but no wheezes or rhonchi or crackles  Heart: irregular rate and rhythm, S1, S2 normal, + murmur, no click, rub or gallop  Abdomen: soft, non-tender; bowel sounds normal; no masses,  no organomegaly  Extremities: extremities normal, atraumatic, no cyanosis or edema  Pulses: 2+ and symmetric  Skin: Skin color, texture, turgor normal. No rashes or lesions  Neurologic: Grossly normal    Hospital Course    New Onset Atrial fibrillation with RVR   - HR in the 140's on arrival to the ER   -  Given diltiazem bolus and started on gtt. On Toprol XL now.   - had episodes of rapid a fib. - Tele monitor   - EKG with bifascicular block   - Troponin negaitve x 3, trend  - TSH normal  - [id number]: 4, AC with full dose Lovenox for now for PPx coverage   - Echo shows EF is diminished  - Cardiology consulted  - stress test ordered-[employer]view-stress test was positive. Patient was transferred to [facility] by ambulance. The hospitalist was notified.     Hypercalcemia resolved  - Mild, 10.7-->9. 8        HLD  - Continue statin      HTN  - BP is controlled   - Continue Cozaar, BB  -Stop Norvasc. On PO Toprol XL.     Chronic CHF -systolic  - ECHO noted.  Not in acute HF     COPD   - No AE   - Continue home inhalers PRN      DM2  - hold home oral Rx   - Continue SSI   - BG is well controlled      Menieres Disease  - stable          [id number] Score for Atrial Fibrillation Stroke Risk   Risk   Factors  Component Value   C CHF Yes 1   H HTN Yes 1   A2 Age >= 75 No,  (76 y.o.) 0   D DM Yes 1   S2 Prior Stroke/TIA No 0   V Vascular Disease No 0   A Age 74-69 Yes,  (76 y.o.) 1   Sc Sex male 0    [id number]  Score  4   Score last updated 8/13/21 6:61 AM EDT    Click here for a link to the UpToDate guideline \"Atrial Fibrillation: Anticoagulation therapy to prevent embolization    Disclaimer: Risk Score calculation is dependent on accuracy of patient problem list and past encounter diagnosis. Results for Maida Zamorano (MRN [id number]) as of 8/13/2021 07:20   Ref. Range 8/11/2021 04:38   TSH Reflex FT4 Latest Ref Range: 0.27 - 4.20 uIU/mL 2.22       CBC: Recent Labs     08/10/21  1037 08/11/21  0438   WBC 6.9 4.8   HGB 13.9 13.0*   HCT 42.7 40.2*   MCV 91.9 92.4    177     BMP:   Recent Labs     08/10/21  1037 08/11/21  0438    139   K 4.1 3.7    101   CO2 25 27   BUN 14 16   CREATININE 1.2 1.2     LIVER PROFILE:   Recent Labs     08/10/21  1037   AST 30   ALT 22   BILITOT 0.5   ALKPHOS 99     PT/INR:   Recent Labs     08/10/21  1037   PROTIME 12.6   INR 1.10     APTT:   Recent Labs     08/10/21  1037   APTT 32.5     UA:No results for input(s): NITRITE, COLORU, PHUR, LABCAST, WBCUA, RBCUA, MUCUS, TRICHOMONAS, YEAST, BACTERIA, CLARITYU, SPECGRAV, LEUKOCYTESUR, UROBILINOGEN, BILIRUBINUR, BLOODU, GLUCOSEU, AMORPHOUS in the last 72 hours. Invalid input(s): Kirit Dom Cardiac Stress Test Nuclear Imaging   Final Result      XR CHEST PORTABLE   Final Result   1. No evidence of pulmonary edema   2.  Bibasilar atelectasis           ECHO 8/11/21   Conclusions      Summary   3/21/2012 Normal EF, LAE, mild MR, TR SPAP 40.5 mmHg.   Left ventricular systolic function is mildly reduced to 45%.   There is mild global hypokinesis.   Left ventricular size is mildly increased.   There is mild concentric left ventricular hypertrophy.   Normal left ventricular diastolic filling pressure.   No evidence of mitral valve stenosis.   Moderate mitral regurgitation.   The left atrium is mildly dilated.   The aortic root is mildly dilated at 4.1cm.   Moderate tricuspid regurgitation.   Systolic pulmonary artery pressure (SPAP) estimated at 47 mmHg (RA pressure   15 mmHg), consistent with mild pulmonary hypertension.   The right atrium is mildly dilated.      Signature      ------------------------------------------------------------------   Electronically signed by Esperanza Tucker MD, [facility] [place] (Interpreting   physician) on 08/11/2021 at 12:06 PM   -----------------------------------------------------------  Stress test          Conclusions        Summary    Small area, moderately severe, reversible perfusion defect involving the    nader-apical, apical segments. Changes fail to resolve with [facility] imaging.    Findings are consistent with inducible ischemia, distal LAD territory.    Global hypokinesis with post-stress LVEF 39%.        Overall findings represent a high risk scan. Discharge Medications     Medication List      ASK your doctor about these medications    amLODIPine 10 MG tablet  Commonly known as: NORVASC     atorvastatin 10 MG tablet  Commonly known as: LIPITOR     budesonide 180 MCG/ACT Aepb inhaler  Commonly known as: PULMICORT     dicyclomine 10 MG capsule  Commonly known as: BENTYL     furosemide 40 MG tablet  Commonly known as: LASIX     glipiZIDE 5 MG tablet  Commonly known as: GLUCOTROL     glycerin-hypromellose- 0.2-0.2-1 % Soln opthalmic solution     losartan 25 MG tablet  Commonly known as: COZAAR     meclizine 25 MG Chew  Commonly known as: ANTIVERT     metFORMIN 1000 MG tablet  Commonly known as: GLUCOPHAGE     metoprolol tartrate 25 MG tablet  Commonly known as: LOPRESSOR     omeprazole 20 MG delayed release capsule  Commonly known as: PRILOSEC     SEMAGLUTIDE (1 MG/DOSE) SC     vitamin D 50 MCG (2000 UT) Caps capsule              Patient was transferred to [address] [place] [address]. The hospitalist at [facility] was notified via perfect serve. He was transferred in stable condition. More than 30 mts spent.       Kanika Zuniga MD 8/13/2021 7:19 AM

## 2021-08-13 NOTE — PROGRESS NOTES
Pt still refusing some medications. IVF hung per order. Shift assessment complete, see flowsheets. No needs or discomforts stated at this time. Call light in easy reach, bedside table in easy reach, and bed in lowest position.   Milton Geeelor, RN

## 2021-08-13 NOTE — PROGRESS NOTES
Pt educated on NPO status at midnight and on planned procedure. Pt states he has no questions.  Milton Geeelor, RN

## 2021-08-13 NOTE — CARE COORDINATION
DISCHARGE ORDER  Date/Time 2021 5:00 PM  Completed by: Silver García RN, Case Management    Patient Name: Nina Morales      : 1950  Admitting Diagnosis: Atrial fibrillation with RVR ([address] [I48.91]      Admit order Date and Status:08/10/2021  (verify MD's last order for status of admission)      Noted discharge order. who______  Discharge Plan: Reviewed chart. Pt transferred to [facility] for a card cath. No further needs needed from .

## 2021-08-13 NOTE — PLAN OF CARE
Problem: Falls - Risk of:  Goal: Will remain free from falls  Description: Will remain free from falls  Outcome: Ongoing  Goal: Absence of physical injury  Description: Absence of physical injury  Outcome: Ongoing     Problem: Cardiac:  Goal: Ability to maintain an adequate cardiac output will improve  Description: Ability to maintain an adequate cardiac output will improve  Outcome: Ongoing  Goal: Hemodynamic stability will improve  Description: Hemodynamic stability will improve  Outcome: Ongoing     Problem: Fluid Volume:  Goal: Ability to achieve and maintain adequate urine output will improve  Description: Ability to achieve and maintain adequate urine output will improve  Outcome: Ongoing     Problem: Respiratory:  Goal: Respiratory status will improve  Description: Respiratory status will improve  Outcome: Ongoing     Problem: OXYGENATION/RESPIRATORY FUNCTION  Goal: Patient will maintain patent airway  Outcome: Ongoing  Goal: Patient will achieve/maintain normal respiratory rate/effort  Description: Respiratory rate and effort will be within normal limits for the patient  Outcome: Ongoing     Problem: HEMODYNAMIC STATUS  Goal: Patient has stable vital signs and fluid balance  Outcome: Ongoing     Problem: FLUID AND ELECTROLYTE IMBALANCE  Goal: Fluid and electrolyte balance are achieved/maintained  Outcome: Ongoing     Problem: ACTIVITY INTOLERANCE/IMPAIRED MOBILITY  Goal: Mobility/activity is maintained at optimum level for patient  Outcome: Ongoing

## 2021-08-13 NOTE — H&P
Hospital Medicine History & Physical      PCP: No primary care provider on file. Date of Admission: 2021    Chief Complaint: abnormal stress test results    History Of Present Illness:    Patient is a 71-year-old male who presented to hospital for hypertension hyperlipidemia COPD, diabetes mellitus, CHF who presented to hospital from outside facility for evaluation for abnormal stress test.  Patient is currently in cardiac Cath Lab at time of my evaluation, patient cannot be physically examined/interviewed, attempted 3 times. Reportedly patient presented to hospital for abnormal stress test result performed yesterday, patient was directly taken to Cath Lab by cardiology. Patient was also noted to have new onset A. fib at previous facility. Past Medical History:          Diagnosis Date    Antral gastritis 11    with possible gastroparesis    Bronchitis chronic     CHF (congestive heart failure) (HCC)     Chronic abdominal pain     saw Dr. Sandra Christensen on 12; EGD, col., CT, RUQ U/S, and HIDA unrem.  Chronic back pain     COPD     PFT's show no obstr. or restr. defect    Diabetes mellitus ([address]     Emphysema of lung ([address]     mild,, bullous    GERD (gastroesophageal reflux disease)     Headache(784.0)     Hiatal hernia 11    Hyperlipidemia     Neg. Cor. Angio. (per 11 o.v. note) & neg. . Echo with Myoview (per 2/25/10 note)    Hypertension     ?     Impotence     Meniere disease     MONICA (obstructive sleep apnea) 10/2/2010    severe; AHI-47; on CPAP pressure of 18    Peripheral vascular disease (HCC)     Pulmonary nodule     Pulmonary nodule     Rash     Renal cyst     small, 1 on each kidney per CT Abd. 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 Mane Trujillo cerebral artery occlusion with cerebral infarction     Vitamin D deficiency 2012    6 ng/mL       Past Surgical History:          Procedure Laterality Date    AMPUTATION      right index    COLONOSCOPY  9/27/2011    ENDOSCOPY, COLON, DIAGNOSTIC      EXTERNAL EAR SURGERY  3/29/2012    Dr. Ming Prasad; benign squamous papilloma    SPIROMETRY  11-2-2009    Total lung capacity improvement over 2007 PFT    UPPER GASTROINTESTINAL ENDOSCOPY  4/21/11     Dr. Delta English; antral gastritis, hiatal hernia, & possible gastroparesis       Medications Prior to Admission:      Prior to Admission medications    Medication Sig Start Date End Date Taking?  Authorizing Provider   amLODIPine (NORVASC) 10 MG tablet Take 10 mg by mouth daily    Historical Provider, MD   losartan (COZAAR) 25 MG tablet Take 12.5 mg by mouth daily    Historical Provider, MD   metoprolol tartrate (LOPRESSOR) 25 MG tablet Take 25 mg by mouth 2 times daily    Historical Provider, MD   furosemide (LASIX) 40 MG tablet Take 40 mg by mouth daily    Historical Provider, MD   budesonide (PULMICORT) 180 MCG/ACT AEPB inhaler Inhale 2 puffs into the lungs 2 times daily    Historical Provider, MD   atorvastatin (LIPITOR) 10 MG tablet Take 10 mg by mouth daily    Historical Provider, MD   glipiZIDE (GLUCOTROL) 5 MG tablet Take 5 mg by mouth 2 times daily (before meals) Take 2 tabs before breakfast and 1 before supper    Historical Provider, MD   metFORMIN (GLUCOPHAGE) 1000 MG tablet Take 1,000 mg by mouth 2 times daily (with meals)    Historical Provider, MD   SEMAGLUTIDE, 1 MG/DOSE, SC Inject 0.25 mg into the skin once a week    Historical Provider, MD   glycerin-hypromellose- 0.2-0.2-1 % SOLN opthalmic solution Place 2 drops into both eyes 4 times daily    Historical Provider, MD   omeprazole (PRILOSEC) 20 MG delayed release capsule Take 20 mg by mouth daily    Historical Provider, MD   Cholecalciferol (VITAMIN D) 50 MCG (2000 UT) CAPS capsule Take 1 capsule by mouth daily    Historical Provider, MD   meclizine (ANTIVERT) 25 MG CHEW Take 25 mg by mouth 3 times daily as needed    Historical Provider, MD dicyclomine (BENTYL) 10 MG capsule Take 10 mg by mouth 4 times daily (before meals and nightly). Historical Provider, MD       Allergies:  Patient has no known allergies. Social History:      TOBACCO:   reports that he quit smoking about 25 years ago. His smoking use included cigarettes. He has a 75.00 pack-year smoking history. He has never used smokeless tobacco.  ETOH:   reports no history of alcohol use. Family History:       Reviewed in detail and non contributory          Problem Relation Age of Onset    Emphysema Father     Diabetes Father     Heart Failure Father     Heart Disease Father         chf    Asthma Maternal Grandmother     Stroke Mother [de-identified]    Cancer Brother 54        pancreatic    Diabetes Daughter     Asthma Daughter     Cancer Daughter     Hypertension Neg Hx        REVIEW OF SYSTEMS:   Pertinent positives as noted in the HPI. All other systems reviewed and negative. PHYSICAL EXAM PERFORMED:    Ht 6' (1.829 m)   Wt 260 lb (117.9 kg)   BMI 35.26 kg/m²     General appearance:  No apparent distress, cooperative. HEENT:  Normal cephalic, atraumatic without obvious deformity. Conjunctivae/corneas clear. Neck: Supple, with full range of motion. No cervical lymphadenopathy  Respiratory:  Normal respiratory effort. Clear to auscultation, bilaterally without Rales/Wheezes/Rhonchi. Cardiovascular:  Regular rate and rhythm with normal S1/S2 without murmurs, rubs or gallops. Abdomen: Soft, non-tender, non-distended, normal bowel sounds. Musculoskeletal:  No edema noted bilaterally. No tenderness on palpation   Skin: no rash visible  Neurologic:  Neurologically intact without any focal sensory/motor deficits.   grossly non-focal.  Psychiatric:  Alert and oriented, normal mood  Peripheral Pulses: +2 palpable, equal bilaterally       Labs:     Recent Labs     08/11/21  0438   WBC 4.8   HGB 13.0*   HCT 40.2*        Recent Labs     08/11/21  0438      K 3.7   CL 101   CO2 27   BUN 16   CREATININE 1.2   CALCIUM 9.8     No results for input(s): AST, ALT, BILIDIR, BILITOT, ALKPHOS in the last 72 hours. No results for input(s): INR in the last 72 hours. Recent Labs     08/10/21  1739 08/10/21  2123   TROPONINI <0.01 <0.01       Urinalysis:      Lab Results   Component Value Date    BLOODU neg 04/17/2012    SPECGRAV 1.025 04/17/2012    GLUCOSEU neg 04/17/2012       Radiology:       No orders to display           Active Hospital Problems    Diagnosis Date Noted    Chest pain [R07.9] 08/13/2021         Patient is a 79-year-old male who presented to hospital for hypertension hyperlipidemia COPD, diabetes mellitus, CHF who presented to hospital from outside facility for evaluation for abnormal stress test.  Patient is currently in cardiac Cath Lab at time of my evaluation, patient cannot be physically examined/interviewed, attempted 3 times. Reportedly patient presented to hospital for abnormal stress test result performed yesterday, patient was directly taken to Cath Lab by cardiology. Patient was also noted to have new onset A. fib at previous facility.     Assessment  Abnormal stress test, currently in cardiac Cath Lab  New onset atrial fibrillation with RVR  Hyperlipidemia  COPD  Diabetes mellitus  Hypertension  Chronic systolic heart failure  Ménière's disease      Plan  In cardiac Cath Lab, cardiology following, monitor on cardiac telemetry, Lexiscan Myoview stress test was performed and was found positive for possible ischemia, patient will be started on metoprolol, amlodipine, continue statin, losartan, check and monitor BMP  Cardiology is consulted  We will order insulin sliding scale  Currently patient is not on anticoagulation-we will defer to cardiology  Holding home Metformin/glipizide  DVT prophylaxis-Heparin  Diet: No diet orders on file  Code Status: Prior    PT/OT Eval Status: ordered    Dispo - pending clinical improvement       Maritza Royal MD    The note was completed using EMR and Dragon dictation system. Every effort was made to ensure accuracy; however, inadvertent computerized transcription errors may be present. Thank you No primary care provider on file. for the opportunity to be involved in this patient's care. If you have any questions or concerns please feel free to contact me at [phone].     Lu Kehr, MD

## 2021-08-13 NOTE — PROGRESS NOTES
08/13/21 0254   NIV Type   Skin Assessment Clean, dry, & intact   Skin Protection for O2 Device Yes   Equipment Type RESPIRONICS   Mode CPAP   Mask Type Full face mask   Mask Size Large   Settings/Measurements   CPAP/EPAP 18 cmH2O   Resp 18   O2 Flow Rate (L/min) 2 L/min   Comfort Level Good   Using Accessory Muscles No   SpO2 92

## 2021-08-16 PROBLEM — I25.10 CORONARY ARTERY DISEASE INVOLVING NATIVE CORONARY ARTERY OF NATIVE HEART WITHOUT ANGINA PECTORIS: Status: ACTIVE | Noted: 2021-08-16

## 2021-08-16 PROBLEM — I35.1 AORTIC REGURGITATION: Status: ACTIVE | Noted: 2021-08-16

## 2021-08-16 PROBLEM — I77.810 AORTIC ROOT DILATION (HCC): Status: ACTIVE | Noted: 2021-08-16

## 2021-08-18 ENCOUNTER — APPOINTMENT (OUTPATIENT)
Dept: GENERAL RADIOLOGY | Age: 71
DRG: 309 | End: 2021-08-18
Payer: OTHER GOVERNMENT

## 2021-08-18 ENCOUNTER — HOSPITAL ENCOUNTER (INPATIENT)
Age: 71
LOS: 2 days | Discharge: HOME | DRG: 309 | End: 2021-08-20
Attending: EMERGENCY MEDICINE | Admitting: INTERNAL MEDICINE
Payer: OTHER GOVERNMENT

## 2021-08-18 DIAGNOSIS — I48.91 ATRIAL FIBRILLATION WITH RVR (HCC): Primary | ICD-10-CM

## 2021-08-18 LAB
A/G RATIO: 1.3 (ref 1.1–2.2)
ALBUMIN SERPL-MCNC: 4 G/DL (ref 3.4–5)
ALP BLD-CCNC: 101 U/L (ref 40–129)
ALT SERPL-CCNC: 23 U/L (ref 10–40)
ANION GAP SERPL CALCULATED.3IONS-SCNC: 15 MMOL/L (ref 3–16)
APTT: 37.3 SEC (ref 26.2–38.6)
AST SERPL-CCNC: 26 U/L (ref 15–37)
BASOPHILS ABSOLUTE: 0.1 K/UL (ref 0–0.2)
BASOPHILS RELATIVE PERCENT: 0.7 %
BILIRUB SERPL-MCNC: 0.5 MG/DL (ref 0–1)
BUN BLDV-MCNC: 24 MG/DL (ref 7–20)
CALCIUM SERPL-MCNC: 10.5 MG/DL (ref 8.3–10.6)
CHLORIDE BLD-SCNC: 103 MMOL/L (ref 99–110)
CO2: 24 MMOL/L (ref 21–32)
CREAT SERPL-MCNC: 1.3 MG/DL (ref 0.8–1.3)
EOSINOPHILS ABSOLUTE: 0.1 K/UL (ref 0–0.6)
EOSINOPHILS RELATIVE PERCENT: 1.7 %
GFR AFRICAN AMERICAN: >60
GFR NON-AFRICAN AMERICAN: 55
GLOBULIN: 3.2 G/DL
GLUCOSE BLD-MCNC: 119 MG/DL (ref 70–99)
GLUCOSE BLD-MCNC: 96 MG/DL (ref 70–99)
HCT VFR BLD CALC: 41.3 % (ref 40.5–52.5)
HEMOGLOBIN: 13.6 G/DL (ref 13.5–17.5)
INR BLD: 1.57 (ref 0.88–1.12)
LYMPHOCYTES ABSOLUTE: 1.3 K/UL (ref 1–5.1)
LYMPHOCYTES RELATIVE PERCENT: 17.1 %
MAGNESIUM: 1.4 MG/DL (ref 1.8–2.4)
MCH RBC QN AUTO: 29.9 PG (ref 26–34)
MCHC RBC AUTO-ENTMCNC: 33 G/DL (ref 31–36)
MCV RBC AUTO: 90.5 FL (ref 80–100)
MONOCYTES ABSOLUTE: 0.6 K/UL (ref 0–1.3)
MONOCYTES RELATIVE PERCENT: 8.5 %
NEUTROPHILS ABSOLUTE: 5.4 K/UL (ref 1.7–7.7)
NEUTROPHILS RELATIVE PERCENT: 72 %
PDW BLD-RTO: 14.1 % (ref 12.4–15.4)
PERFORMED ON: ABNORMAL
PLATELET # BLD: 202 K/UL (ref 135–450)
PMV BLD AUTO: 8.6 FL (ref 5–10.5)
POTASSIUM SERPL-SCNC: 3.8 MMOL/L (ref 3.5–5.1)
PROTHROMBIN TIME: 18 SEC (ref 9.9–12.7)
RBC # BLD: 4.56 M/UL (ref 4.2–5.9)
SODIUM BLD-SCNC: 142 MMOL/L (ref 136–145)
TOTAL PROTEIN: 7.2 G/DL (ref 6.4–8.2)
WBC # BLD: 7.5 K/UL (ref 4–11)

## 2021-08-18 PROCEDURE — 2580000003 HC RX 258: Performed by: EMERGENCY MEDICINE

## 2021-08-18 PROCEDURE — 94761 N-INVAS EAR/PLS OXIMETRY MLT: CPT

## 2021-08-18 PROCEDURE — 94640 AIRWAY INHALATION TREATMENT: CPT

## 2021-08-18 PROCEDURE — 2500000003 HC RX 250 WO HCPCS: Performed by: EMERGENCY MEDICINE

## 2021-08-18 PROCEDURE — 80053 COMPREHEN METABOLIC PANEL: CPT

## 2021-08-18 PROCEDURE — 36415 COLL VENOUS BLD VENIPUNCTURE: CPT

## 2021-08-18 PROCEDURE — 6370000000 HC RX 637 (ALT 250 FOR IP): Performed by: PEDIATRICS

## 2021-08-18 PROCEDURE — 71045 X-RAY EXAM CHEST 1 VIEW: CPT

## 2021-08-18 PROCEDURE — 83735 ASSAY OF MAGNESIUM: CPT

## 2021-08-18 PROCEDURE — 6370000000 HC RX 637 (ALT 250 FOR IP): Performed by: EMERGENCY MEDICINE

## 2021-08-18 PROCEDURE — 2060000000 HC ICU INTERMEDIATE R&B

## 2021-08-18 PROCEDURE — 85025 COMPLETE CBC W/AUTO DIFF WBC: CPT

## 2021-08-18 PROCEDURE — 93005 ELECTROCARDIOGRAM TRACING: CPT | Performed by: EMERGENCY MEDICINE

## 2021-08-18 PROCEDURE — 85610 PROTHROMBIN TIME: CPT

## 2021-08-18 PROCEDURE — 99285 EMERGENCY DEPT VISIT HI MDM: CPT

## 2021-08-18 PROCEDURE — 6370000000 HC RX 637 (ALT 250 FOR IP): Performed by: INTERNAL MEDICINE

## 2021-08-18 PROCEDURE — 94660 CPAP INITIATION&MGMT: CPT

## 2021-08-18 PROCEDURE — 96374 THER/PROPH/DIAG INJ IV PUSH: CPT

## 2021-08-18 PROCEDURE — 85730 THROMBOPLASTIN TIME PARTIAL: CPT

## 2021-08-18 RX ORDER — PANTOPRAZOLE SODIUM 40 MG/1
40 TABLET, DELAYED RELEASE ORAL
Status: DISCONTINUED | OUTPATIENT
Start: 2021-08-19 | End: 2021-08-18

## 2021-08-18 RX ORDER — VITAMIN B COMPLEX
2000 TABLET ORAL DAILY
Status: DISCONTINUED | OUTPATIENT
Start: 2021-08-19 | End: 2021-08-18

## 2021-08-18 RX ORDER — ATORVASTATIN CALCIUM 10 MG/1
20 TABLET, FILM COATED ORAL DAILY
Status: DISCONTINUED | OUTPATIENT
Start: 2021-08-19 | End: 2021-08-19

## 2021-08-18 RX ORDER — GLIPIZIDE 5 MG/1
5 TABLET ORAL
Status: DISCONTINUED | OUTPATIENT
Start: 2021-08-19 | End: 2021-08-20 | Stop reason: HOSPADM

## 2021-08-18 RX ORDER — ATORVASTATIN CALCIUM 10 MG/1
10 TABLET, FILM COATED ORAL DAILY
Status: DISCONTINUED | OUTPATIENT
Start: 2021-08-19 | End: 2021-08-18

## 2021-08-18 RX ORDER — NICOTINE POLACRILEX 4 MG
15 LOZENGE BUCCAL PRN
Status: DISCONTINUED | OUTPATIENT
Start: 2021-08-18 | End: 2021-08-20 | Stop reason: HOSPADM

## 2021-08-18 RX ORDER — ONDANSETRON 2 MG/ML
4 INJECTION INTRAMUSCULAR; INTRAVENOUS EVERY 6 HOURS PRN
Status: DISCONTINUED | OUTPATIENT
Start: 2021-08-18 | End: 2021-08-20 | Stop reason: HOSPADM

## 2021-08-18 RX ORDER — METOPROLOL SUCCINATE 50 MG/1
100 TABLET, EXTENDED RELEASE ORAL DAILY
Status: DISCONTINUED | OUTPATIENT
Start: 2021-08-19 | End: 2021-08-20

## 2021-08-18 RX ORDER — SODIUM CHLORIDE 9 MG/ML
25 INJECTION, SOLUTION INTRAVENOUS PRN
Status: DISCONTINUED | OUTPATIENT
Start: 2021-08-18 | End: 2021-08-20 | Stop reason: HOSPADM

## 2021-08-18 RX ORDER — AMLODIPINE BESYLATE 5 MG/1
10 TABLET ORAL DAILY
Status: DISCONTINUED | OUTPATIENT
Start: 2021-08-19 | End: 2021-08-19

## 2021-08-18 RX ORDER — HEPARIN SODIUM 1000 [USP'U]/ML
80 INJECTION, SOLUTION INTRAVENOUS; SUBCUTANEOUS ONCE
Status: DISCONTINUED | OUTPATIENT
Start: 2021-08-18 | End: 2021-08-18

## 2021-08-18 RX ORDER — FLUTICASONE PROPIONATE 110 UG/1
2 AEROSOL, METERED RESPIRATORY (INHALATION) 2 TIMES DAILY
Status: DISCONTINUED | OUTPATIENT
Start: 2021-08-18 | End: 2021-08-18

## 2021-08-18 RX ORDER — VITAMIN B COMPLEX
2000 TABLET ORAL DAILY
Status: DISCONTINUED | OUTPATIENT
Start: 2021-08-19 | End: 2021-08-20 | Stop reason: HOSPADM

## 2021-08-18 RX ORDER — PANTOPRAZOLE SODIUM 40 MG/1
40 TABLET, DELAYED RELEASE ORAL
Status: DISCONTINUED | OUTPATIENT
Start: 2021-08-19 | End: 2021-08-19 | Stop reason: SDUPTHER

## 2021-08-18 RX ORDER — HEPARIN SODIUM 1000 [USP'U]/ML
40 INJECTION, SOLUTION INTRAVENOUS; SUBCUTANEOUS PRN
Status: DISCONTINUED | OUTPATIENT
Start: 2021-08-18 | End: 2021-08-18

## 2021-08-18 RX ORDER — FUROSEMIDE 40 MG/1
40 TABLET ORAL DAILY
Status: DISCONTINUED | OUTPATIENT
Start: 2021-08-19 | End: 2021-08-19

## 2021-08-18 RX ORDER — HEPARIN SODIUM 10000 [USP'U]/100ML
5-30 INJECTION, SOLUTION INTRAVENOUS CONTINUOUS
Status: DISCONTINUED | OUTPATIENT
Start: 2021-08-18 | End: 2021-08-18

## 2021-08-18 RX ORDER — LOSARTAN POTASSIUM 25 MG/1
12.5 TABLET ORAL DAILY
Status: DISCONTINUED | OUTPATIENT
Start: 2021-08-19 | End: 2021-08-18

## 2021-08-18 RX ORDER — SODIUM CHLORIDE 9 MG/ML
INJECTION, SOLUTION INTRAVENOUS CONTINUOUS
Status: DISCONTINUED | OUTPATIENT
Start: 2021-08-18 | End: 2021-08-19

## 2021-08-18 RX ORDER — ONDANSETRON 4 MG/1
4 TABLET, ORALLY DISINTEGRATING ORAL EVERY 8 HOURS PRN
Status: DISCONTINUED | OUTPATIENT
Start: 2021-08-18 | End: 2021-08-20 | Stop reason: HOSPADM

## 2021-08-18 RX ORDER — CARBOXYMETHYLCELLULOSE SODIUM 10 MG/ML
2 GEL OPHTHALMIC 4 TIMES DAILY
Status: DISCONTINUED | OUTPATIENT
Start: 2021-08-18 | End: 2021-08-20 | Stop reason: HOSPADM

## 2021-08-18 RX ORDER — ACETAMINOPHEN 650 MG/1
650 SUPPOSITORY RECTAL EVERY 6 HOURS PRN
Status: DISCONTINUED | OUTPATIENT
Start: 2021-08-18 | End: 2021-08-20 | Stop reason: HOSPADM

## 2021-08-18 RX ORDER — SODIUM CHLORIDE 0.9 % (FLUSH) 0.9 %
5-40 SYRINGE (ML) INJECTION EVERY 12 HOURS SCHEDULED
Status: DISCONTINUED | OUTPATIENT
Start: 2021-08-18 | End: 2021-08-20 | Stop reason: HOSPADM

## 2021-08-18 RX ORDER — AMLODIPINE BESYLATE 10 MG/1
10 TABLET ORAL DAILY
Status: ON HOLD | COMMUNITY
End: 2021-08-20 | Stop reason: HOSPADM

## 2021-08-18 RX ORDER — FLUTICASONE PROPIONATE 110 UG/1
2 AEROSOL, METERED RESPIRATORY (INHALATION) 2 TIMES DAILY
Status: DISCONTINUED | OUTPATIENT
Start: 2021-08-18 | End: 2021-08-20 | Stop reason: HOSPADM

## 2021-08-18 RX ORDER — LOSARTAN POTASSIUM 25 MG/1
12.5 TABLET ORAL DAILY
Status: DISCONTINUED | OUTPATIENT
Start: 2021-08-19 | End: 2021-08-19

## 2021-08-18 RX ORDER — SODIUM CHLORIDE 0.9 % (FLUSH) 0.9 %
5-40 SYRINGE (ML) INJECTION PRN
Status: DISCONTINUED | OUTPATIENT
Start: 2021-08-18 | End: 2021-08-20 | Stop reason: HOSPADM

## 2021-08-18 RX ORDER — POLYETHYLENE GLYCOL 3350 17 G/17G
17 POWDER, FOR SOLUTION ORAL DAILY PRN
Status: DISCONTINUED | OUTPATIENT
Start: 2021-08-18 | End: 2021-08-20 | Stop reason: HOSPADM

## 2021-08-18 RX ORDER — DILTIAZEM HYDROCHLORIDE 5 MG/ML
10 INJECTION INTRAVENOUS ONCE
Status: COMPLETED | OUTPATIENT
Start: 2021-08-18 | End: 2021-08-18

## 2021-08-18 RX ORDER — HEPARIN SODIUM 1000 [USP'U]/ML
80 INJECTION, SOLUTION INTRAVENOUS; SUBCUTANEOUS PRN
Status: DISCONTINUED | OUTPATIENT
Start: 2021-08-18 | End: 2021-08-18

## 2021-08-18 RX ORDER — DEXTROSE MONOHYDRATE 50 MG/ML
100 INJECTION, SOLUTION INTRAVENOUS PRN
Status: DISCONTINUED | OUTPATIENT
Start: 2021-08-18 | End: 2021-08-20 | Stop reason: HOSPADM

## 2021-08-18 RX ORDER — METOPROLOL SUCCINATE 25 MG/1
25 TABLET, EXTENDED RELEASE ORAL 2 TIMES DAILY
Status: DISCONTINUED | OUTPATIENT
Start: 2021-08-18 | End: 2021-08-18

## 2021-08-18 RX ORDER — FUROSEMIDE 40 MG/1
40 TABLET ORAL DAILY
Status: DISCONTINUED | OUTPATIENT
Start: 2021-08-19 | End: 2021-08-18

## 2021-08-18 RX ORDER — DICYCLOMINE HYDROCHLORIDE 10 MG/1
10 CAPSULE ORAL
Status: DISCONTINUED | OUTPATIENT
Start: 2021-08-18 | End: 2021-08-20 | Stop reason: HOSPADM

## 2021-08-18 RX ORDER — ACETAMINOPHEN 325 MG/1
650 TABLET ORAL EVERY 6 HOURS PRN
Status: DISCONTINUED | OUTPATIENT
Start: 2021-08-18 | End: 2021-08-20 | Stop reason: HOSPADM

## 2021-08-18 RX ORDER — DEXTROSE MONOHYDRATE 25 G/50ML
12.5 INJECTION, SOLUTION INTRAVENOUS PRN
Status: DISCONTINUED | OUTPATIENT
Start: 2021-08-18 | End: 2021-08-20 | Stop reason: HOSPADM

## 2021-08-18 RX ADMIN — SODIUM CHLORIDE: 900 INJECTION INTRAVENOUS at 18:27

## 2021-08-18 RX ADMIN — Medication 2 PUFF: at 22:43

## 2021-08-18 RX ADMIN — SODIUM CHLORIDE: 900 INJECTION INTRAVENOUS at 21:34

## 2021-08-18 RX ADMIN — DILTIAZEM HYDROCHLORIDE 10 MG: 5 INJECTION INTRAVENOUS at 18:25

## 2021-08-18 RX ADMIN — APIXABAN 5 MG: 5 TABLET, FILM COATED ORAL at 19:54

## 2021-08-18 RX ADMIN — Medication 10 MG/HR: at 18:41

## 2021-08-18 ASSESSMENT — PAIN SCALES - GENERAL: PAINLEVEL_OUTOF10: 0

## 2021-08-18 ASSESSMENT — ENCOUNTER SYMPTOMS
SHORTNESS OF BREATH: 0
ABDOMINAL PAIN: 0
ABDOMINAL DISTENTION: 0
CHEST TIGHTNESS: 0
WHEEZING: 0
COUGH: 0

## 2021-08-18 NOTE — ED PROVIDER NOTES
[facility]      Pt Name: Yolette Luis  MRN: [id number]  [place] 1950  Date of evaluation: 8/18/2021  Provider: Mathew Palmer MD    [address] [place] [address]       Chief Complaint   Patient presents with    Tachycardia     Pt with HR of 48-200s @ [facility] per clinic staff         HISTORY OF PRESENT ILLNESS   (Location/Symptom, Timing/Onset, Context/Setting, Quality, Duration, Modifying Factors, Severity)  Note limiting factors. Yolette Luis is a 72 y.o. male who presents to the emergency department     The patient has a history apparently of last week having an episode of atrial fib  He had been apparently on Eliquis at one time but this patient is such a poor historian that we really cannot figure it out if he is still on it or not he does have a history of hypertensive disease history of GE history of dilated cardiomyopathy history of aortic root valve dilated Tatian of 4.1cm he does had a normal heart cath last week by  which basically showed a normal heart cath. He is on meta propyl all 100 in the morning and 50 at night he has history of diabetes  He has a history of dilated cardiomyopathy with global hypokinesia with an LV function of 45%  The patient is mention unsure of his anticoagulation  For rate control he is on meta propyl all as noted above  He presents from apparently a [place] clinic without much history at all apparently went there for routine follow-up and was found to have a heart rate of 160 irregular. Obviously atrial fib. There is no new ischemic or injury pattern. He has no chest pain in fact he is kind of unaware really of his palpitations although he says he can feel his heart going a little bit fast    The history is provided by the patient. Nursing Notes were reviewed.     REVIEW OF SYSTEMS    (2-9 systems for level 4, 10 or more for level 5)     Review of Systems   Constitutional: Positive for activity change and appetite change. Negative for diaphoresis, fatigue and fever. Eyes: Negative for visual disturbance. Respiratory: Negative for cough, chest tightness, shortness of breath and wheezing. Cardiovascular: Positive for palpitations. Negative for chest pain and leg swelling. Gastrointestinal: Negative for abdominal distention and abdominal pain. Genitourinary: Negative for difficulty urinating. Allergic/Immunologic: Negative for immunocompromised state. Neurological: Negative for light-headedness and headaches. All other systems reviewed and are negative. Except as noted above the remainder of the review of systems was reviewed and negative. PAST MEDICAL HISTORY     Past Medical History:   Diagnosis Date    Antral gastritis 11    with possible gastroparesis    Bronchitis chronic     CHF (congestive heart failure) ([facility])     Chronic abdominal pain     saw Dr. Lidya Bustamante on 12; EGD, col., CT, RUQ U/S, and HIDA unrem.  Chronic back pain     COPD     PFT's show no obstr. or restr. defect    Coronary artery disease. [facility] 21 moderate non-obstructive CAD.  2021    Diabetes mellitus ([facility] [address]     Emphysema of lung (HCC)     mild,, bullous    GERD (gastroesophageal reflux disease)     Headache(784.0)     Hiatal hernia 11    Hyperlipidemia     Neg. Cor. Angio. (per 11 o.v. note) & neg. . Echo with Myoview (per 2/25/10 note)    Hypertension     ?     Impotence     Meniere disease     MONICA (obstructive sleep apnea) 10/2/2010    severe; AHI-47; on CPAP pressure of 18    Peripheral vascular disease (HCC)     Pulmonary nodule     Pulmonary nodule     Rash     Renal cyst     small, 1 on each kidney per CT Abd. 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 Reese Fung cerebral artery occlusion with cerebral infarction     Vitamin D deficiency 2012    6 ng/mL         SURGICAL HISTORY       Past Surgical History:   Procedure Laterality Date    AMPUTATION      right index    COLONOSCOPY  9/27/2011    ENDOSCOPY, COLON, DIAGNOSTIC      EXTERNAL EAR SURGERY  3/29/2012    Dr. Ava Spencer; benign squamous papilloma    SPIROMETRY  11-2-2009    Total lung capacity improvement over 2007 PFT    UPPER GASTROINTESTINAL ENDOSCOPY  4/21/11     Dr. Osiel Young; antral gastritis, hiatal hernia, & possible gastroparesis         CURRENT MEDICATIONS       Previous Medications    AMLODIPINE (NORVASC) 10 MG TABLET    Take 10 mg by mouth daily    APIXABAN (ELIQUIS) 5 MG TABS TABLET    Take 5 mg by mouth 2 times daily    ATORVASTATIN (LIPITOR) 40 MG TABLET    Take 1 tablet by mouth daily    BUDESONIDE (PULMICORT) 180 MCG/ACT AEPB INHALER    Inhale 2 puffs into the lungs 2 times daily    CHOLECALCIFEROL (VITAMIN D) 50 MCG (2000 UT) CAPS CAPSULE    Take 1 capsule by mouth daily    FUROSEMIDE (LASIX) 40 MG TABLET    Take 1 tablet by mouth daily    GLIPIZIDE (GLUCOTROL) 5 MG TABLET    Take 5 mg by mouth 2 times daily (before meals) Take 2 tabs before breakfast and 1 before supper    GLYCERIN-HYPROMELLOSE- 0.2-0.2-1 % SOLN OPTHALMIC SOLUTION    Place 2 drops into both eyes 4 times daily    LOSARTAN (COZAAR) 25 MG TABLET    Take 0.5 tablets by mouth daily    METFORMIN (GLUCOPHAGE) 1000 MG TABLET    Take 1,000 mg by mouth 2 times daily (with meals)    METOPROLOL SUCCINATE (TOPROL XL) 50 MG EXTENDED RELEASE TABLET    Take 1 tablet by mouth daily Take 2 tablets by mouth (100 mg) every morning and one tablet by mouth (50 mg) every evening. OMEPRAZOLE (PRILOSEC) 20 MG DELAYED RELEASE CAPSULE    Take 20 mg by mouth daily    SEMAGLUTIDE, 1 MG/DOSE, SC    Inject 0.25 mg into the skin once a week       ALLERGIES     Patient has no known allergies.     FAMILY HISTORY       Family History   Problem Relation Age of Onset    Emphysema Father     Diabetes Father     Heart Failure Father     Heart Disease Father         chf    Asthma Maternal Grandmother     Stroke Mother [de-identified]    Cancer Brother 54        pancreatic    Diabetes Daughter     Asthma Daughter    Padma See Cancer Daughter     Hypertension Neg Hx           SOCIAL HISTORY       Social History     Socioeconomic History    Marital status: Single     Spouse name: None    Number of children: None    Years of education: None    Highest education level: None   Occupational History    None   Tobacco Use    Smoking status: Former Smoker     Packs/day: 2.50     Years: 30.00     Pack years: 75.00     Types: Cigarettes     Quit date: 1996     Years since quittin.3    Smokeless tobacco: Never Used   Substance and Sexual Activity    Alcohol use: No    Drug use: No    Sexual activity: Never   Other Topics Concern    None   Social History Narrative    None     Social Determinants of Health     Financial Resource Strain: Low Risk     Difficulty of Paying Living Expenses: Not very hard   Food Insecurity: No Food Insecurity    Worried About Running Out of Food in the Last Year: Never true    Mariusz of Food in the Last Year: Never true   Transportation Needs:     Lack of Transportation (Medical):      Lack of Transportation (Non-Medical):    Physical Activity: Inactive    Days of Exercise per Week: 0 days    Minutes of Exercise per Session: 0 min   Stress:     Feeling of Stress :    Social Connections: Socially Isolated    Frequency of Communication with Friends and Family: More than three times a week    Frequency of Social Gatherings with Friends and Family: Once a week    Attends Baptism Services: Never    Active Member of Clubs or Organizations: No    Attends Club or Organization Meetings: Never    Marital Status:    Intimate Partner Violence:     Fear of Current or Ex-Partner:     Emotionally Abused:     Physically Abused:     Sexually Abused:        SCREENINGS    [place] Coma Scale  Eye Opening: Spontaneous  Best Verbal Response: Oriented  Best Motor Response: Obeys commands  Dmitry Coma Scale Score: 15          PHYSICAL EXAM    (up to 7 for level 4, 8 or more for level 5)     ED Triage Vitals [08/18/21 1700]   BP Temp Temp Source Pulse Resp SpO2 Height Weight   128/88 98.3 °F (36.8 °C) Oral 172 20 99 % 6' (1.829 m) 260 lb (117.9 kg)       Physical Exam  Vitals and nursing note reviewed. Constitutional:       Appearance: He is morbidly obese. He is ill-appearing. HENT:      Head: Normocephalic. Right Ear: Tympanic membrane, ear canal and external ear normal.      Left Ear: Tympanic membrane, ear canal and external ear normal.      Nose: Nose normal.      Mouth/Throat:      Mouth: Mucous membranes are moist.   Eyes:      Extraocular Movements: Extraocular movements intact. Pupils: Pupils are equal, round, and reactive to light. Cardiovascular:      Rate and Rhythm: Tachycardia present. Rhythm irregular. Pulmonary:      Effort: Pulmonary effort is normal.      Breath sounds: Normal breath sounds. Abdominal:      General: There is no distension. Palpations: There is no mass. Musculoskeletal:         General: Normal range of motion. Cervical back: Normal range of motion. No tenderness. Skin:     Capillary Refill: Capillary refill takes less than 2 seconds. Neurological:      General: No focal deficit present. Mental Status: He is lethargic. DIAGNOSTIC RESULTS     EKG: All EKG's are interpreted by the Emergency Department Physician who either signs or Co-signs this chart in the absence of a cardiologist.        RADIOLOGY:   Non-plain film images such as CT, Ultrasound and MRI are read by the radiologist. Plain radiographic images are visualized and preliminarily interpreted by the emergency physician with the below findings:        Interpretation per the Radiologist below, if available at the time of this note:    XR CHEST PORTABLE   Final Result   No acute cardiopulmonary process.                  LABS:  Results for orders placed or performed during the hospital encounter of 08/18/21   CBC Auto Differential   Result Value Ref Range    WBC 7.5 4.0 - 11.0 K/uL    RBC 4.56 4.20 - 5.90 M/uL    Hemoglobin 13.6 13.5 - 17.5 g/dL    Hematocrit 41.3 40.5 - 52.5 %    MCV 90.5 80.0 - 100.0 fL    MCH 29.9 26.0 - 34.0 pg    MCHC 33.0 31.0 - 36.0 g/dL    RDW 14.1 12.4 - 15.4 %    Platelets 047 108 - 304 K/uL    MPV 8.6 5.0 - 10.5 fL    Neutrophils % 72.0 %    Lymphocytes % 17.1 %    Monocytes % 8.5 %    Eosinophils % 1.7 %    Basophils % 0.7 %    Neutrophils Absolute 5.4 1.7 - 7.7 K/uL    Lymphocytes Absolute 1.3 1.0 - 5.1 K/uL    Monocytes Absolute 0.6 0.0 - 1.3 K/uL    Eosinophils Absolute 0.1 0.0 - 0.6 K/uL    Basophils Absolute 0.1 0.0 - 0.2 K/uL   Comprehensive Metabolic Panel   Result Value Ref Range    Sodium 142 136 - 145 mmol/L    Potassium 3.8 3.5 - 5.1 mmol/L    Chloride 103 99 - 110 mmol/L    CO2 24 21 - 32 mmol/L    Anion Gap 15 3 - 16    Glucose 96 70 - 99 mg/dL    BUN 24 (H) 7 - 20 mg/dL    CREATININE 1.3 0.8 - 1.3 mg/dL    GFR Non-African American 55 (A) >60    GFR African American >60 >60    Calcium 10.5 8.3 - 10.6 mg/dL    Total Protein 7.2 6.4 - 8.2 g/dL    Albumin 4.0 3.4 - 5.0 g/dL    Albumin/Globulin Ratio 1.3 1.1 - 2.2    Total Bilirubin 0.5 0.0 - 1.0 mg/dL    Alkaline Phosphatase 101 40 - 129 U/L    ALT 23 10 - 40 U/L    AST 26 15 - 37 U/L    Globulin 3.2 g/dL   Protime-INR   Result Value Ref Range    Protime 18.0 (H) 9.9 - 12.7 sec    INR 1.57 (H) 0.88 - 1.12   Magnesium   Result Value Ref Range    Magnesium 1.40 (L) 1.80 - 2.40 mg/dL   APTT   Result Value Ref Range    aPTT 37.3 26.2 - 38.6 sec   EKG 12 Lead   Result Value Ref Range    Ventricular Rate 159 BPM    Atrial Rate 33 BPM    QRS Duration 140 ms    Q-T Interval 332 ms    QTc Calculation (Bazett) 540 ms    R Axis 267 degrees    T Axis 17 degrees    Diagnosis       Undetermined rhythmRight bundle branch blockPossible Lateral infarct , age undeterminedAbnormal ECGNo previous ECGs available            EMERGENCY DEPARTMENT COURSE and DIFFERENTIAL DIAGNOSIS/MDM:     Vitals:    08/18/21 1830 08/18/21 1840 08/18/21 1846 08/18/21 1850   BP: 123/74 99/66  105/76   Pulse: 146 117 120 120   Resp: 20 (!) 31 20 18   Temp:       TempSrc:       SpO2: 98% 97% 98% 98%   Weight:       Height:               MDM      REASSESSMENT          CRITICAL CARE TIME   This patient presented with an unstable heart rate of 165. It was irregular. Patient was a very poor historian. Physical exam performed  Old records reviewed fortunately at appears that he has had a heart cath just within the last week or so which was normal.  He was supposed to go home on a blood thinner but we are not sure we could ever verify this. I did just start him on Eliquis 5 mg orally. Patient will be admitted I did give him Cardizem which is already slowed him down I did give him a bolus and a drip. A total of 45 minutes of critical care time was spent managing this patient who presented with a heart rate of 165  Labs ordered reviewed  Chest x-ray ordered reviewed  EKG ordered and interpreted  Discussion with our internal medicine team was performed once again 45 minutes critical care time (no procedure time)  CONSULTS:  None      PROCEDURES:     Procedures    MEDICATIONS GIVEN THIS VISIT:  Medications   0.9 % sodium chloride infusion ( Intravenous New Bag 8/18/21 1827)   dilTIAZem 125 mg in dextrose 5% 125 mL infusion (10 mg/hr Intravenous New Bag 8/18/21 1841)   apixaban (ELIQUIS) tablet 5 mg (has no administration in time range)   dilTIAZem injection 10 mg (10 mg Intravenous Given 8/18/21 1825)        FINAL IMPRESSION      1. Atrial fibrillation with RVR ([facility] [address]            DISPOSITION/PLAN   DISPOSITION Admitted 08/18/2021 07:24:38 PM      PATIENT REFERRED TO:  No follow-up provider specified.     DISCHARGE MEDICATIONS:  New Prescriptions    No medications on file       Controlled Substances Monitoring  No flowsheet data found. (Please note that portions of this note were completed with a voice recognition program.  Efforts were made to edit the dictations but occasionally words are mis-transcribed.)    Patient was advised to return to the Emergency Department if there was any worsening.     Rupa Rios MD (electronically signed)  Attending Emergency Physician         Chaim Moore MD  08/18/21 8401

## 2021-08-18 NOTE — ED NOTES
Bed: 02  Expected date:   Expected time:   Means of arrival:   Comments:  [facility]     Khoi Dotson RN  08/18/21 1700

## 2021-08-18 NOTE — Clinical Note
Patient Class: Inpatient [101]   REQUIRED: Diagnosis: Atrial fibrillation with rapid ventricular response ([facility] [address] [[id number]]   Estimated Length of Stay: Estimated stay of more than 2 midnights   Admitting Provider: Mariel Swift [[id number]]

## 2021-08-19 ENCOUNTER — ANESTHESIA (OUTPATIENT)
Dept: ENDOSCOPY | Age: 71
DRG: 309 | End: 2021-08-19
Payer: OTHER GOVERNMENT

## 2021-08-19 ENCOUNTER — ANESTHESIA EVENT (OUTPATIENT)
Dept: ENDOSCOPY | Age: 71
DRG: 309 | End: 2021-08-19
Payer: OTHER GOVERNMENT

## 2021-08-19 VITALS
DIASTOLIC BLOOD PRESSURE: 56 MMHG | SYSTOLIC BLOOD PRESSURE: 116 MMHG | OXYGEN SATURATION: 85 % | RESPIRATION RATE: 17 BRPM

## 2021-08-19 DIAGNOSIS — I48.91 ATRIAL FIBRILLATION WITH RVR (HCC): Primary | ICD-10-CM

## 2021-08-19 LAB
ANION GAP SERPL CALCULATED.3IONS-SCNC: 12 MMOL/L (ref 3–16)
APTT: 38.6 SEC (ref 26.2–38.6)
APTT: 38.9 SEC (ref 26.2–38.6)
APTT: 39.6 SEC (ref 26.2–38.6)
BUN BLDV-MCNC: 23 MG/DL (ref 7–20)
CALCIUM SERPL-MCNC: 10 MG/DL (ref 8.3–10.6)
CHLORIDE BLD-SCNC: 105 MMOL/L (ref 99–110)
CO2: 24 MMOL/L (ref 21–32)
CREAT SERPL-MCNC: 1.1 MG/DL (ref 0.8–1.3)
EKG ATRIAL RATE: 33 BPM
EKG ATRIAL RATE: 357 BPM
EKG ATRIAL RATE: 66 BPM
EKG DIAGNOSIS: NORMAL
EKG P AXIS: 56 DEGREES
EKG P-R INTERVAL: 166 MS
EKG Q-T INTERVAL: 332 MS
EKG Q-T INTERVAL: 426 MS
EKG Q-T INTERVAL: 466 MS
EKG QRS DURATION: 140 MS
EKG QRS DURATION: 142 MS
EKG QRS DURATION: 142 MS
EKG QTC CALCULATION (BAZETT): 485 MS
EKG QTC CALCULATION (BAZETT): 488 MS
EKG QTC CALCULATION (BAZETT): 540 MS
EKG R AXIS: -46 DEGREES
EKG R AXIS: -63 DEGREES
EKG R AXIS: 267 DEGREES
EKG T AXIS: 17 DEGREES
EKG T AXIS: 17 DEGREES
EKG T AXIS: 5 DEGREES
EKG VENTRICULAR RATE: 159 BPM
EKG VENTRICULAR RATE: 66 BPM
EKG VENTRICULAR RATE: 78 BPM
GFR AFRICAN AMERICAN: >60
GFR NON-AFRICAN AMERICAN: >60
GLUCOSE BLD-MCNC: 109 MG/DL (ref 70–99)
GLUCOSE BLD-MCNC: 116 MG/DL (ref 70–99)
GLUCOSE BLD-MCNC: 123 MG/DL (ref 70–99)
GLUCOSE BLD-MCNC: 129 MG/DL (ref 70–99)
GLUCOSE BLD-MCNC: 131 MG/DL (ref 70–99)
LV EF: 30 %
LVEF MODALITY: NORMAL
MAGNESIUM: 1.5 MG/DL (ref 1.8–2.4)
PERFORMED ON: ABNORMAL
POTASSIUM REFLEX MAGNESIUM: 4 MMOL/L (ref 3.5–5.1)
SARS-COV-2, NAAT: NOT DETECTED
SODIUM BLD-SCNC: 141 MMOL/L (ref 136–145)

## 2021-08-19 PROCEDURE — 36415 COLL VENOUS BLD VENIPUNCTURE: CPT

## 2021-08-19 PROCEDURE — B24BZZ4 ULTRASONOGRAPHY OF HEART WITH AORTA, TRANSESOPHAGEAL: ICD-10-PCS | Performed by: INTERNAL MEDICINE

## 2021-08-19 PROCEDURE — 6360000002 HC RX W HCPCS: Performed by: NURSE PRACTITIONER

## 2021-08-19 PROCEDURE — 2580000003 HC RX 258: Performed by: NURSE ANESTHETIST, CERTIFIED REGISTERED

## 2021-08-19 PROCEDURE — 85730 THROMBOPLASTIN TIME PARTIAL: CPT

## 2021-08-19 PROCEDURE — 99255 IP/OBS CONSLTJ NEW/EST HI 80: CPT | Performed by: INTERNAL MEDICINE

## 2021-08-19 PROCEDURE — 99232 SBSQ HOSP IP/OBS MODERATE 35: CPT | Performed by: INTERNAL MEDICINE

## 2021-08-19 PROCEDURE — 93312 ECHO TRANSESOPHAGEAL: CPT | Performed by: INTERNAL MEDICINE

## 2021-08-19 PROCEDURE — 6370000000 HC RX 637 (ALT 250 FOR IP): Performed by: EMERGENCY MEDICINE

## 2021-08-19 PROCEDURE — 3700000000 HC ANESTHESIA ATTENDED CARE: Performed by: INTERNAL MEDICINE

## 2021-08-19 PROCEDURE — 2060000000 HC ICU INTERMEDIATE R&B

## 2021-08-19 PROCEDURE — 94640 AIRWAY INHALATION TREATMENT: CPT

## 2021-08-19 PROCEDURE — 7100000011 HC PHASE II RECOVERY - ADDTL 15 MIN: Performed by: INTERNAL MEDICINE

## 2021-08-19 PROCEDURE — 2709999900 HC NON-CHARGEABLE SUPPLY: Performed by: INTERNAL MEDICINE

## 2021-08-19 PROCEDURE — 2500000003 HC RX 250 WO HCPCS: Performed by: NURSE ANESTHETIST, CERTIFIED REGISTERED

## 2021-08-19 PROCEDURE — 80048 BASIC METABOLIC PNL TOTAL CA: CPT

## 2021-08-19 PROCEDURE — 87635 SARS-COV-2 COVID-19 AMP PRB: CPT

## 2021-08-19 PROCEDURE — 93010 ELECTROCARDIOGRAM REPORT: CPT | Performed by: INTERNAL MEDICINE

## 2021-08-19 PROCEDURE — 6370000000 HC RX 637 (ALT 250 FOR IP): Performed by: PEDIATRICS

## 2021-08-19 PROCEDURE — 6370000000 HC RX 637 (ALT 250 FOR IP): Performed by: INTERNAL MEDICINE

## 2021-08-19 PROCEDURE — 7100000010 HC PHASE II RECOVERY - FIRST 15 MIN: Performed by: INTERNAL MEDICINE

## 2021-08-19 PROCEDURE — 2580000003 HC RX 258: Performed by: INTERNAL MEDICINE

## 2021-08-19 PROCEDURE — 2580000003 HC RX 258: Performed by: EMERGENCY MEDICINE

## 2021-08-19 PROCEDURE — 93005 ELECTROCARDIOGRAM TRACING: CPT | Performed by: INTERNAL MEDICINE

## 2021-08-19 PROCEDURE — 2500000003 HC RX 250 WO HCPCS: Performed by: EMERGENCY MEDICINE

## 2021-08-19 PROCEDURE — 94761 N-INVAS EAR/PLS OXIMETRY MLT: CPT

## 2021-08-19 PROCEDURE — 93312 ECHO TRANSESOPHAGEAL: CPT

## 2021-08-19 PROCEDURE — 6360000002 HC RX W HCPCS: Performed by: NURSE ANESTHETIST, CERTIFIED REGISTERED

## 2021-08-19 PROCEDURE — 5A2204Z RESTORATION OF CARDIAC RHYTHM, SINGLE: ICD-10-PCS | Performed by: INTERNAL MEDICINE

## 2021-08-19 PROCEDURE — 92960 CARDIOVERSION ELECTRIC EXT: CPT | Performed by: INTERNAL MEDICINE

## 2021-08-19 PROCEDURE — 83735 ASSAY OF MAGNESIUM: CPT

## 2021-08-19 PROCEDURE — 3700000001 HC ADD 15 MINUTES (ANESTHESIA): Performed by: INTERNAL MEDICINE

## 2021-08-19 RX ORDER — PANTOPRAZOLE SODIUM 40 MG/1
40 TABLET, DELAYED RELEASE ORAL
Status: DISCONTINUED | OUTPATIENT
Start: 2021-08-20 | End: 2021-08-20 | Stop reason: HOSPADM

## 2021-08-19 RX ORDER — HYDRALAZINE HYDROCHLORIDE 20 MG/ML
5 INJECTION INTRAMUSCULAR; INTRAVENOUS EVERY 10 MIN PRN
Status: DISCONTINUED | OUTPATIENT
Start: 2021-08-19 | End: 2021-08-20 | Stop reason: HOSPADM

## 2021-08-19 RX ORDER — AMIODARONE HYDROCHLORIDE 200 MG/1
200 TABLET ORAL 2 TIMES DAILY
Status: DISCONTINUED | OUTPATIENT
Start: 2021-08-19 | End: 2021-08-20 | Stop reason: HOSPADM

## 2021-08-19 RX ORDER — MEPERIDINE HYDROCHLORIDE 25 MG/ML
12.5 INJECTION INTRAMUSCULAR; INTRAVENOUS; SUBCUTANEOUS EVERY 5 MIN PRN
Status: DISCONTINUED | OUTPATIENT
Start: 2021-08-19 | End: 2021-08-20 | Stop reason: HOSPADM

## 2021-08-19 RX ORDER — SODIUM CHLORIDE 0.9 % (FLUSH) 0.9 %
5-40 SYRINGE (ML) INJECTION EVERY 12 HOURS SCHEDULED
Status: CANCELLED | OUTPATIENT
Start: 2021-08-19

## 2021-08-19 RX ORDER — SODIUM CHLORIDE 0.9 % (FLUSH) 0.9 %
5-40 SYRINGE (ML) INJECTION PRN
Status: CANCELLED | OUTPATIENT
Start: 2021-08-19

## 2021-08-19 RX ORDER — LOSARTAN POTASSIUM 25 MG/1
50 TABLET ORAL DAILY
Status: DISCONTINUED | OUTPATIENT
Start: 2021-08-20 | End: 2021-08-20 | Stop reason: HOSPADM

## 2021-08-19 RX ORDER — SODIUM CHLORIDE 9 MG/ML
25 INJECTION, SOLUTION INTRAVENOUS PRN
Status: DISCONTINUED | OUTPATIENT
Start: 2021-08-19 | End: 2021-08-20 | Stop reason: HOSPADM

## 2021-08-19 RX ORDER — SODIUM CHLORIDE 0.9 % (FLUSH) 0.9 %
5-40 SYRINGE (ML) INJECTION EVERY 12 HOURS SCHEDULED
Status: DISCONTINUED | OUTPATIENT
Start: 2021-08-19 | End: 2021-08-20 | Stop reason: HOSPADM

## 2021-08-19 RX ORDER — SODIUM CHLORIDE 0.9 % (FLUSH) 0.9 %
5-40 SYRINGE (ML) INJECTION PRN
Status: DISCONTINUED | OUTPATIENT
Start: 2021-08-19 | End: 2021-08-20 | Stop reason: HOSPADM

## 2021-08-19 RX ORDER — PROPOFOL 10 MG/ML
INJECTION, EMULSION INTRAVENOUS PRN
Status: DISCONTINUED | OUTPATIENT
Start: 2021-08-19 | End: 2021-08-19 | Stop reason: SDUPTHER

## 2021-08-19 RX ORDER — MAGNESIUM SULFATE IN WATER 40 MG/ML
2000 INJECTION, SOLUTION INTRAVENOUS ONCE
Status: COMPLETED | OUTPATIENT
Start: 2021-08-19 | End: 2021-08-19

## 2021-08-19 RX ORDER — SODIUM CHLORIDE, SODIUM LACTATE, POTASSIUM CHLORIDE, CALCIUM CHLORIDE 600; 310; 30; 20 MG/100ML; MG/100ML; MG/100ML; MG/100ML
INJECTION, SOLUTION INTRAVENOUS CONTINUOUS PRN
Status: DISCONTINUED | OUTPATIENT
Start: 2021-08-19 | End: 2021-08-19 | Stop reason: SDUPTHER

## 2021-08-19 RX ORDER — ATORVASTATIN CALCIUM 40 MG/1
40 TABLET, FILM COATED ORAL DAILY
Status: DISCONTINUED | OUTPATIENT
Start: 2021-08-20 | End: 2021-08-20 | Stop reason: HOSPADM

## 2021-08-19 RX ORDER — ONDANSETRON 2 MG/ML
4 INJECTION INTRAMUSCULAR; INTRAVENOUS
Status: ACTIVE | OUTPATIENT
Start: 2021-08-19 | End: 2021-08-19

## 2021-08-19 RX ORDER — OXYCODONE HYDROCHLORIDE AND ACETAMINOPHEN 5; 325 MG/1; MG/1
1 TABLET ORAL PRN
Status: ACTIVE | OUTPATIENT
Start: 2021-08-19 | End: 2021-08-19

## 2021-08-19 RX ORDER — OXYCODONE HYDROCHLORIDE AND ACETAMINOPHEN 5; 325 MG/1; MG/1
2 TABLET ORAL PRN
Status: ACTIVE | OUTPATIENT
Start: 2021-08-19 | End: 2021-08-19

## 2021-08-19 RX ORDER — LIDOCAINE HYDROCHLORIDE 20 MG/ML
INJECTION, SOLUTION EPIDURAL; INFILTRATION; INTRACAUDAL; PERINEURAL PRN
Status: DISCONTINUED | OUTPATIENT
Start: 2021-08-19 | End: 2021-08-19 | Stop reason: SDUPTHER

## 2021-08-19 RX ORDER — PROMETHAZINE HYDROCHLORIDE 25 MG/ML
6.25 INJECTION, SOLUTION INTRAMUSCULAR; INTRAVENOUS
Status: ACTIVE | OUTPATIENT
Start: 2021-08-19 | End: 2021-08-19

## 2021-08-19 RX ORDER — FENTANYL CITRATE 50 UG/ML
25 INJECTION, SOLUTION INTRAMUSCULAR; INTRAVENOUS EVERY 5 MIN PRN
Status: DISCONTINUED | OUTPATIENT
Start: 2021-08-19 | End: 2021-08-20 | Stop reason: HOSPADM

## 2021-08-19 RX ORDER — SODIUM CHLORIDE 9 MG/ML
INJECTION, SOLUTION INTRAVENOUS CONTINUOUS
Status: DISCONTINUED | OUTPATIENT
Start: 2021-08-19 | End: 2021-08-19

## 2021-08-19 RX ORDER — SODIUM CHLORIDE 9 MG/ML
25 INJECTION, SOLUTION INTRAVENOUS PRN
Status: CANCELLED | OUTPATIENT
Start: 2021-08-19

## 2021-08-19 RX ADMIN — PROPOFOL 200 MG: 10 INJECTION, EMULSION INTRAVENOUS at 09:41

## 2021-08-19 RX ADMIN — CARBOXYMETHYLCELLULOSE SODIUM 2 DROP: 10 GEL OPHTHALMIC at 13:52

## 2021-08-19 RX ADMIN — GLIPIZIDE 5 MG: 5 TABLET ORAL at 15:50

## 2021-08-19 RX ADMIN — MAGNESIUM SULFATE HEPTAHYDRATE 2000 MG: 40 INJECTION, SOLUTION INTRAVENOUS at 11:39

## 2021-08-19 RX ADMIN — APIXABAN 5 MG: 5 TABLET, FILM COATED ORAL at 21:22

## 2021-08-19 RX ADMIN — Medication 10 MG/HR: at 05:19

## 2021-08-19 RX ADMIN — Medication 2 PUFF: at 07:55

## 2021-08-19 RX ADMIN — Medication 2 PUFF: at 20:09

## 2021-08-19 RX ADMIN — Medication 2000 UNITS: at 11:16

## 2021-08-19 RX ADMIN — GLIPIZIDE 5 MG: 5 TABLET ORAL at 11:15

## 2021-08-19 RX ADMIN — CARBOXYMETHYLCELLULOSE SODIUM 2 DROP: 10 GEL OPHTHALMIC at 11:19

## 2021-08-19 RX ADMIN — DICYCLOMINE HYDROCHLORIDE 10 MG: 10 CAPSULE ORAL at 16:59

## 2021-08-19 RX ADMIN — SODIUM CHLORIDE, POTASSIUM CHLORIDE, SODIUM LACTATE AND CALCIUM CHLORIDE: 600; 310; 30; 20 INJECTION, SOLUTION INTRAVENOUS at 09:37

## 2021-08-19 RX ADMIN — LIDOCAINE HYDROCHLORIDE 50 MG: 20 INJECTION, SOLUTION EPIDURAL; INFILTRATION; INTRACAUDAL; PERINEURAL at 09:41

## 2021-08-19 RX ADMIN — CARBOXYMETHYLCELLULOSE SODIUM 2 DROP: 10 GEL OPHTHALMIC at 16:59

## 2021-08-19 RX ADMIN — AMIODARONE HYDROCHLORIDE 200 MG: 200 TABLET ORAL at 21:22

## 2021-08-19 RX ADMIN — Medication 10 ML: at 21:23

## 2021-08-19 RX ADMIN — Medication 10 ML: at 21:25

## 2021-08-19 RX ADMIN — SODIUM CHLORIDE 25 ML: 9 INJECTION, SOLUTION INTRAVENOUS at 11:35

## 2021-08-19 RX ADMIN — METFORMIN HYDROCHLORIDE 1000 MG: 500 TABLET ORAL at 11:14

## 2021-08-19 RX ADMIN — DICYCLOMINE HYDROCHLORIDE 10 MG: 10 CAPSULE ORAL at 06:13

## 2021-08-19 RX ADMIN — APIXABAN 5 MG: 5 TABLET, FILM COATED ORAL at 11:15

## 2021-08-19 RX ADMIN — METOPROLOL SUCCINATE 100 MG: 50 TABLET, EXTENDED RELEASE ORAL at 11:16

## 2021-08-19 RX ADMIN — AMIODARONE HYDROCHLORIDE 200 MG: 200 TABLET ORAL at 11:30

## 2021-08-19 RX ADMIN — METFORMIN HYDROCHLORIDE 1000 MG: 500 TABLET ORAL at 16:58

## 2021-08-19 RX ADMIN — CARBOXYMETHYLCELLULOSE SODIUM 2 DROP: 10 GEL OPHTHALMIC at 21:23

## 2021-08-19 RX ADMIN — SODIUM CHLORIDE: 900 INJECTION INTRAVENOUS at 05:20

## 2021-08-19 RX ADMIN — ONDANSETRON 4 MG: 4 TABLET, ORALLY DISINTEGRATING ORAL at 13:41

## 2021-08-19 RX ADMIN — DICYCLOMINE HYDROCHLORIDE 10 MG: 10 CAPSULE ORAL at 11:15

## 2021-08-19 RX ADMIN — DICYCLOMINE HYDROCHLORIDE 10 MG: 10 CAPSULE ORAL at 21:22

## 2021-08-19 ASSESSMENT — PAIN SCALES - GENERAL
PAINLEVEL_OUTOF10: 0

## 2021-08-19 ASSESSMENT — ENCOUNTER SYMPTOMS: SHORTNESS OF BREATH: 1

## 2021-08-19 NOTE — PROGRESS NOTES
Patient had large BM and stated that relieved much of the abdominal discomfort. No additional needs expressed at this time. Call light provided within reach of patient.  Juan Gupta RN

## 2021-08-19 NOTE — PROGRESS NOTES
Shift report given to nurses Kelly Carter and Orysia Mortimer at bedside. Patient care handed off in stable condition at this time.  Mainor Palumbo RN

## 2021-08-19 NOTE — PROGRESS NOTES
08/19/21 0303   NIV Type   Equipment Type RESPIRONICS   Mode CPAP   Mask Type Full face mask   Mask Size Large   Settings/Measurements   CPAP/EPAP 18 cmH2O   Resp 20   FiO2  21 %   Comfort Level Good   Using Accessory Muscles No   SpO2 93

## 2021-08-19 NOTE — CONSULTS
[address] [facility]  [phone]        Reason for Consultation/Chief Complaint: \"I have been having rapid heart beat . \"  Seen in [facility] clinic yesterday for follow HR was in 160's irregular and they sent him to the ED  I am familiar with this patient admitted to hospital here last week for same thing. He had appointment to see me on 8.17.21 but did not keep as he was afraid [facility] may not [facility] for the visit which [place] said they will pay. History of Present Illness: Maggie Schumacher is a 72 y.o. patient who presented to the hospital with complaints of   Rapid heart rate referred by [facility]. He said he tried [facility] before going to the clinic but nurse could not hear him and hung up. So he drove there rushing and got anxious short of breath and sweating . He was suppose to be on metoprolol 150mg daily but has been taking only 50mg daily. Recent admission and consult by Dr Cande Kennedy is a 72 y.o. patient who presented 8/11/21  to the hospital with complaints of  irreg heart beat. He is patient at [facility] and suffers from MONICA- CPAP pressure 18cm formerly seen by Dr Tamir Howe, DM2, HBP,HLD,  CKD II. Incidentally found to have irreg heart beat during his outpatient check up and was sent to this hospital for evaluation. He has positive stress test transferred to [facility] and underwent LHC revealing Moderate non-obstructive coronary artery disease. Non-ischemic cardiomyopathy   I have been asked to provide consultation regarding further management and testing. Past Medical History:   has a past medical history of Antral gastritis, Bronchitis chronic, CHF (congestive heart failure) ([address] Chronic abdominal pain, Chronic back pain, COPD, Coronary artery disease.  LHC 8/12/21 moderate non-obstructive CAD. , Diabetes mellitus ([address] Emphysema of lung ([address] GERD (gastroesophageal reflux disease), Headache(784.0), Hiatal hernia, Hyperlipidemia, Hypertension, Impotence, Meniere disease, MONICA (obstructive sleep apnea), Peripheral vascular disease ([facility] [address] Pulmonary nodule, Pulmonary nodule, Rash, Renal cyst, Squamous papilloma, Unspecified cerebral artery occlusion with cerebral infarction, and Vitamin D deficiency. Surgical History:   has a past surgical history that includes spirometry (11-2-2009); amputation; Upper gastrointestinal endoscopy (4/21/11 ); External ear surgery (3/29/2012); Endoscopy, colon, diagnostic; and Colonoscopy (9/27/2011). Social History:   reports that he quit smoking about 25 years ago. His smoking use included cigarettes. He has a 75.00 pack-year smoking history. He has never used smokeless tobacco. He reports that he does not drink alcohol and does not use drugs. Family History:  family history includes Asthma in his daughter and maternal grandmother; Cancer in his daughter; Cancer (age of onset: 54) in his brother; Diabetes in his daughter and father; Emphysema in his father; Heart Disease in his father; Heart Failure in his father; Stroke (age of onset: [de-identified]) in his mother. Home Medications:  Were reviewed and are listed in nursing record. and/or listed below  Prior to Admission medications    Medication Sig Start Date End Date Taking?  Authorizing Provider   amLODIPine (NORVASC) 10 MG tablet Take 10 mg by mouth daily   Yes Historical Provider, MD   apixaban (ELIQUIS) 5 MG TABS tablet Take 5 mg by mouth 2 times daily   Yes Historical Provider, MD   atorvastatin (LIPITOR) 40 MG tablet Take 1 tablet by mouth daily  Patient taking differently: Take 20 mg by mouth daily Take 1/2 tab daily 8/13/21  Yes Preston Travis, DO   losartan (COZAAR) 25 MG tablet Take 0.5 tablets by mouth daily 8/13/21  Yes Preston Travis, DO   furosemide (LASIX) 40 MG tablet Take 1 tablet by mouth daily 8/13/21  Yes Preston Travis, DO   metoprolol succinate (TOPROL XL) 50 MG extended release tablet Take 1 tablet by mouth daily Take 2 tablets by mouth (100 mg) every morning and one tablet by mouth (50 mg) every evening. Patient taking differently: Take 25 mg by mouth 2 times daily  8/13/21  Yes Preston Haddox, DO   budesonide (PULMICORT) 180 MCG/ACT AEPB inhaler Inhale 2 puffs into the lungs 2 times daily   Yes Historical Provider, MD   glipiZIDE (GLUCOTROL) 5 MG tablet Take 5 mg by mouth 2 times daily (before meals) Take 2 tabs before breakfast and 1 before supper   Yes Historical Provider, MD   metFORMIN (GLUCOPHAGE) 1000 MG tablet Take 1,000 mg by mouth 2 times daily (with meals)   Yes Historical Provider, MD   SEMAGLUTIDE, 1 MG/DOSE, SC Inject 0.25 mg into the skin once a week   Yes Historical Provider, MD   glycerin-hypromellose- 0.2-0.2-1 % SOLN opthalmic solution Place 2 drops into both eyes 4 times daily   Yes Historical Provider, MD   omeprazole (PRILOSEC) 20 MG delayed release capsule Take 20 mg by mouth daily   Yes Historical Provider, MD   Cholecalciferol (VITAMIN D) 50 MCG (2000 UT) CAPS capsule Take 1 capsule by mouth daily   Yes Historical Provider, MD        Allergies:  Patient has no known allergies.      Review of Systems:   12 point ROS negative in all areas as listed below except as in [place]  Constitutional, EENT,  GI, , Musculoskeletal, skin, neurological, hematological, endocrine, Psychiatric    Physical Examination:    Vitals:    08/19/21 0902   BP: 112/66   Pulse: 72   Resp: 16   Temp: 96.9 °F (36.1 °C)   SpO2: 96%    Weight: 255 lb 11.2 oz (116 kg)         General Appearance:  Alert, cooperative, no distress, appears stated age   Head:  Normocephalic, without obvious abnormality, atraumatic   Eyes:  PERRL, conjunctiva/corneas clear       Nose: Nares normal, no drainage or sinus tenderness   Throat: Lips, mucosa, and tongue normal   Neck: Supple, symmetrical, trachea midline, no adenopathy, thyroid: not enlarged, symmetric, no tenderness/mass/nodules, no carotid bruit or JVD       Lungs:   Clear to auscultation bilaterally, respirations unlabored   Chest Wall: No tenderness or deformity   Heart:  irreg irreg  rate and rhythm, S2 normal, no murmur, rub or gallop   Abdomen:   Soft, non-tender, bowel sounds active all four quadrants,  no masses, no organomegaly           Extremities: Extremities normal, atraumatic, no cyanosis or edema   Pulses: 2+ and symmetric   Skin: Skin color, texture, turgor normal, no rashes or lesions   Pysch: Normal mood and affect   Neurologic: Normal gross motor and sensory exam.         Labs  CBC:   Lab Results   Component Value Date    WBC 7.5 2021    RBC 4.56 2021    HGB 13.6 2021    HCT 41.3 2021    MCV 90.5 2021    RDW 14.1 2021     2021     CMP:    Lab Results   Component Value Date     2021    K 4.0 2021     2021    CO2 24 2021    BUN 23 2021    CREATININE 1.1 2021    GFRAA >60 2021    GFRAA >60 08/15/2012    AGRATIO 1.3 2021    LABGLOM >60 2021    GLUCOSE 116 2021    PROT 7.2 2021    PROT 7.1 08/15/2012    CALCIUM 10.0 2021    BILITOT 0.5 2021    ALKPHOS 101 2021    AST 26 2021    ALT 23 2021     PT/INR:  No results found for: PTINR  Lab Results   Component Value Date    TROPONINI <0.01 08/10/2021   I have reviewed the following tests and documented in this encounter as follows:   Discussed with patient. EK21  I have reviewed EKG with the following interpretation:  Impression:  Atrial fibrillationwith rapid ventricular responseRight bundle branch blockPossible Lateral infarct , age undeterminedAbnormal ECGNo significant change was foundWhen compared with ECG of8.10.21Confirmed by Mercy Cabot MD, [address] () on 2021 5:30:08 AM    CXR 21  FINDINGS: There is no acute consolidation or effusion. There is no pneumothorax. The mediastinal structures are unremarkable. The upper abdomen is unremarkable. The extrathoracic soft tissues are unremarkable.   There is no acute osseous abnormality. Cardiac cath 8.13.21  1. Hemodynamics:              A. Opening arterial pressure: 108/74 (87) mmHg     2. Coronary anatomy:  A. Left main artery: The left main artery bifurcates into the left anterior descending artery and left circumflex artery. The left main artery has mild disease. B. Left anterior descending artery: Transapical vessel which gives rise to 3 diagonal arteries. The LAD has a 20% proximal stenosis and 40-50% mid-vessel stenosis. The first diagonal artery is a large vessel with minor luminal irregularities. The second diagonal artery is a small vessel with a 30% ostial stenosis. The third diagonal artery is a medium caliber vessel with minor luminal irregularities. C. Left circumflex artery: Non-dominant vessel that gives rise to 2 obtuse marginal arteries and 2 posterolateral branches. The LCx has 20% proximal, 30% mid-vessel, and 20% distal stenoses. The OM1 is a medium caliber vessel with 30% ostial and 20% proximal stenoses. The OM2 is very small and has mild disease. The first left posterolateral branch is a medium-large caliber vessel with minor luminal irregularities. The second left posterolateral branch is also a medium-large caliber vessel with minor luminal irregularities. D. Right coronary artery: Dominant vessel that gives rise to the posterior descending artery and posterolateral branch. The RCA has 20% proximal and 40% mid-vessel stenoses. The posterior descending artery has minor luminal irregularities. The RPLB has minor luminal irregularities.     Technical Factors:  Complications: None.   Estimated blood loss: Minimal.  Radiation: Air kerma 1,293 mGy and 2.5 minutes of fluoroscopy  Sedation: Moderate conscious sedation was administered by qualified nursing personnel under continuous hemodynamic monitoring, starting at 1:35 PM and ending at 1:50 PM.  Medications: 2.5 mg IV verapamil, 2 mg IV Versed, 50 mcg IV Fentanyl, 5 mg IV metoprolol tartrate, 5,000 units IV heparin  Contrast: 50 cc of Isovue      Impression:  1. Moderate non-obstructive coronary artery disease. 2. Non-ischemic cardiomyopathy. May be tachycardia-induced in setting of atrial   fibrillation with RVR. Stress test 8/12/21  Summary Small area, moderately severe, reversible perfusion defect involving the  nader-apical, apical segments. Changes fail to resolve with [facility] imaging. Findings are consistent with inducible ischemia, distal LAD territory. Global hypokinesis with post-stress LVEF 39%. Overall findings represent a high risk scan. EKG 8/10/21   8.10.21  Atrial fibrillation with rapid ventricular responseRight bundle branch blockLeft anterior fascicular block Bifascicular block    Abnormal ECGPrevious EKG on 2.10.16 had sinus rhythm. Confirmed by Angella Ruiz MD, [address] ([id number]) on   8/11/2021 5:14:05 AM      echo complete 8.11.21   Left ventricular systolic function is mildly reduced to 45%. There is mild global hypokinesis. Left ventricular size is mildly increased. There is mild concentric left ventricular hypertrophy. Normal left ventricular diastolic filling pressure. No evidence of mitral valve stenosis. Moderate mitral regurgitation. The left atrium is mildly dilated. The aortic root is mildly dilated at 4.1cm. Moderate tricuspid regurgitation. Systolic pulmonary artery pressure (SPAP) estimated at 47 mmHg (RA pressure   15 mmHg), consistent with mild pulmonary hypertension. The right atrium is mildly dilated.     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 induced cardiomyopathy  CAD without angina  Hypertension  Hyperlipidemia  Sleep apnea  Patient Active Problem List   Diagnosis    Hypertension    Hyperlipidemia    GERD (gastroesophageal reflux disease)    COPD (chronic obstructive pulmonary disease) (HCC)    Vitamin D deficiency    Vertigo    Meniere disease    Depression    Chronic abdominal pain    Atrial fibrillation with RVR (HCC)    Hypercalcemia    Type 2 diabetes mellitus without complication (HCC)    CHF (congestive heart failure) (HCC)    MONICA on CPAP    Dilated cardiomyopathy (HCC)    Positive cardiac stress test    Chest pain    Abnormal stress test    Cardiomyopathy (HCC)    Exertional dyspnea    Coronary artery disease. [facility] 8/12/21 moderate non-obstructive CAD.  Aortic root dilation (HCC)    Aortic regurgitation    Atrial fibrillation with rapid ventricular response (HCC)         Plan:  YUKI followed by [facility]  After YUKI and cardioversion start PO amiodarone  Metoprolol succinate for low EF  Ace inhibitors for low EF  Will follow in office in three weeks  Full anticoagulation with apixaban, No aspirin since he will be on full anticoagulation. Statin moderate dose     procedures explained to patient indications risks benefits alternatives discussed understood and agreed all questions answered. Thank you for allowing to us to participate in the care or Yolette Luis. Further evaluation will be based upon the patient's clinical course and testing results.      Cristofer Sheth MD

## 2021-08-19 NOTE — PROGRESS NOTES
coronary artery disease. 2. Non-ischemic cardiomyopathy.  May be tachycardia-induced in setting of atrial   fibrillation with RVR.     Stress test 8/12/21  Summary Small area, moderately severe, reversible perfusion defect involving the  nader-apical, apical segments. Changes fail to resolve with [facility] imaging.  Findings are consistent with inducible ischemia, distal LAD territory.  Global hypokinesis with post-stress LVEF 39%.   Overall findings represent a high risk scan.        Assessment/Plan:  Atrial fibrillation with RVR   - HR in the 170's on arrival to the ER   -  Given diltiazem bolus and started on gtt.   - Tele monitor   - [id number]: 4, on Eliquis  - Echo shows EF is 45%  - Cardiology consulted  - patient is having YUKI and cardioversion today.       Hypomagnesemia  - replace electrolytes. Monitor labs. DM2  - continue home oral Rx   - Continue SSI   - BG is well controlled      HLD  - Continue Atorvastatin      Hypertension  - BP is controlled   - Continue Cozaar, Toprol-XL, Amlodipine     Chronic CHF -systolic  - ECHO noted. Not in acute CHF     COPD   - No AE   - Continue home inhalers PRN     MONICA  - home CPAP    DVT Prophylaxis: Eliquis  Diet: Diet NPO Exceptions are: Ice Chips, Sips of Water with Meds  Code Status: Full Code    Flora Norris FNP-C  8/19/2021    Atrial fibrillation with rapid rate. Start on Cardizem drip after Cardizem bolus. [id number] score is 4.  Echocardiogram shows EF of 45%. Underwent YUKI and cardioversion today. Currently in sinus rhythm. RIGOBERTO Lundberg.

## 2021-08-19 NOTE — ANESTHESIA POSTPROCEDURE EVALUATION
Department of Anesthesiology  Postprocedure Note    Patient: Chelsey Douglas  MRN: [id number]  YOB: 1950  Date of evaluation: 8/19/2021    Procedure Summary     Date: 08/19/21 Room / Location: [address] [place] [address]    Anesthesia Start: 3425 Anesthesia Stop: 1001    Procedures:       TRANSESOPHAGEAL ECHOCARDIOGRAM (N/A )      CARDIOVERSION (N/A ) Diagnosis: (A-FIB)    Surgeons: Daniel Dillard MD Responsible Provider: Andree Morris MD    Anesthesia Type: TIVA, MAC ASA Status: 3        Anesthesia Type: TIVA, MAC    Mohan Phase I: Mohan Score: 10    Mohan Phase II: Mohan Score: 10    Last vitals: Reviewed and per EMR flowsheets.      Anesthesia Post Evaluation   Anesthetic Problems: no   Cardiovascular System Stable: yes  Respiratory Function: Airway Patent yes  ETT no  Ventilator no  Level of consciousness: awake, alert and oriented  Post-op pain: adequate analgesia  Hydration Adequate: yes  Nausea/Vomiting:no  Other Issues:     Gem Delgado MD

## 2021-08-19 NOTE — ANESTHESIA PRE PROCEDURE
regurgitation. Systolic pulmonary artery pressure (SPAP) estimated at 47 mmHg, consistent with mild pulmonary hypertension. The right atrium is mildly dilated. CARDIAC CATH:  08/12/2021    1. Moderate non-obstructive coronary artery disease. 2. Non-ischemic cardiomyopathy. May be tachycardia-induced in setting of atrial fibrillation with RVR.      Medications prior to admission:   amLODIPine (NORVASC) 10 MG tablet Take 10 mg by mouth daily   apixaban (ELIQUIS) 5 MG TABS tablet Take 5 mg by mouth 2 times daily   atorvastatin (LIPITOR) 40 MG tablet Take 20 mg by mouth daily Take 1/2 tab daily   losartan (COZAAR) 25 MG tablet Take 0.5 tablets by mouth daily   furosemide (LASIX) 40 MG tablet Take 1 tablet by mouth daily   metoprolol succinate (TOPROL XL) 50 MG  Take 25 mg by mouth 2 times daily    budesonide (PULMICORT) 180 MCG/ACT AEPB inhaler Inhale 2 puffs into the lungs 2 times daily   glipiZIDE (GLUCOTROL) 5 MG tablet Take 5 mg 2 times daily (before meals) Take 2 tabs before breakfast and 1 before supper   metFORMIN (GLUCOPHAGE) 1000 MG tablet Take 1,000 mg by mouth 2 times daily (with meals)   SEMAGLUTIDE, 1 MG/DOSE, SC Inject 0.25 mg into the skin once a week   glycerin-hypromellose-PEG opthalmic solution Place 2 drops into both eyes 4 times daily   omeprazole (PRILOSEC) 20 MG delayed release capsule Take 20 mg by mouth daily   Cholecalciferol (VITAMIN D) 50 MCG (2000 UT) CAPS capsule Take 1 capsule by mouth daily     Current medications:      dilTIAZem 125 mg in dextrose 5% 125 mL infusion  5-15 mg/hr Intravenous Continuous    apixaban (ELIQUIS) tablet 5 mg  5 mg Oral BID    acetaminophen (TYLENOL) tablet 650 mg  650 mg Oral Q6H PRN       acetaminophen (TYLENOL) suppository 650 mg  650 mg Rectal Q6H PRN    ondansetron (ZOFRAN-ODT) disintegrating tablet 4 mg  4 mg Oral Q8H PRN       ondansetron (ZOFRAN) injection 4 mg  4 mg Intravenous Q6H PRN    polyethylene glycol (GLYCOLAX) packet 17 g  17 g Oral Daily PRN    albuterol-ipratropium (COMBIVENT RESPIMAT) inhaler   1 puff Inhalation Q6H PRN    carboxymethylcellulose PF (THERATEARS) 1 % ophth gel   2 drop Both Eyes 4x Daily    dextrose 5 % solution  100 mL/hr Intravenous PRN    dextrose 50 % IV solution  12.5 g Intravenous PRN    dicyclomine (BENTYL) capsule 10 mg  10 mg Oral 4x Daily AC & HS    glucagon (rDNA) injection 1 mg  1 mg Intramuscular PRN    glucose (GLUTOSE) 40 % oral gel 15 g  15 g Oral PRN    insulin lispro (HUMALOG) injection vial 0-6 Units  0-6 Units Subcutaneous TID WC    insulin lispro (HUMALOG) injection vial 0-3 Units  0-3 Units Subcutaneous Nightly    metoprolol succinate (TOPROL XL)   100 mg Oral Daily    amLODIPine (NORVASC) tablet 10 mg  10 mg Oral Daily    atorvastatin (LIPITOR) tablet 20 mg  20 mg Oral Daily    furosemide (LASIX) tablet 40 mg  40 mg Oral Daily    glipiZIDE (GLUCOTROL) tablet 5 mg  5 mg Oral BID AC    losartan (COZAAR) tablet 12.5 mg  12.5 mg Oral Daily    pantoprazole (PROTONIX) tablet 40 mg  40 mg Oral QAM AC    metFORMIN (GLUCOPHAGE) tablet 1,000 mg  1,000 mg Oral BID WC    Vitamin D (CHOLECALCIFEROL) tablet 2,000 Units  2,000 Units Oral Daily    fluticasone (FLOVENT HFA) 110 MCG/ACT inhaler 2 puff  2 puff Inhalation BID     Allergies:  No Known Allergies    Problem List:     Hypertension    Hyperlipidemia    GERD (gastroesophageal reflux disease)    [facility] disease[facility]    Vitamin D deficiency    Vertigo    Meniere disease    Depression    Chronic abdominal pain    Hypercalcemia    Type 2 diabetes mellitus without complication (HCC)    CHF (congestive heart failure) ([facility])    MONICA on CPAP    Dilated cardiomyopathy (HCC)    Positive cardiac stress test    Chest pain    Cardiomyopathy ([address]    Exertional dyspnea    Coronary artery [facility]. [facility] 8/12/21 moderate non-obstructive CAD.      Aortic root dilation (HCC)    Aortic regurgitation    Atrial fibrillation with rapid ventricular response [facility]     Past Medical History:     Antral gastritis 11    with possible gastroparesis    Bronchitis chronic     CH[facility]congestive heart failure) [facility]     Chronic abdominal pain     saw Dr. Luzmaria Rodriguez on 12; EGD, col., CT, RUQ U/S, and HIDA unrem.  Chronic back pain     COPD     PFT's show no obstr. or restr. defect    Coronary artery disease. [facility] 21 moderate non-obstructive CAD.  2021    Diabetes mellitus ([facility]     Emphysema of lung (HCC)     mild,, bullous    GERD (gastroesophageal reflux disease)     Headache(784.0)     Hiatal hernia 11    Hyperlipidemia     Hypertension     ?  Impotence     Meniere disease     MONICA (obstructive sleep apnea) 10/2/2010    severe; AHI-47; on CPAP pressure of 18    Peripheral vascular disease (HCC)     Pulmonary nodule     Pulmonary nodule     Rash     Renal cyst     small, 1 on each kidney per CT Abd. 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 Willy Martin cerebral artery occlusion with cerebral infarction     Vitamin D deficiency 2012    6 ng/mL     Past Surgical History:     AMPUTATION      right index    COLONOSCOPY  2011    ENDOSCOPY, COLON, DIAGNOSTIC      EXTERNAL EAR SURGERY  3/29/2012    Dr. Sky Donis; benign squamous papilloma    SPIROMETRY  2009    Total lung capacity improvement over  PFT    UPPER GASTROINTESTINAL ENDOSCOPY  11     Dr. Luzmaria Rodriguez; antral gastritis, hiatal hernia, & possible gastroparesis     Social History:     Smoking status: Former Smoker     Packs/day: 2.50     Years: 30.00     Pack years: 75.00     Types: Cigarettes     Quit date: 1996     Years since quittin.3    Smokeless tobacco: Never Used   Substance Use Topics    Alcohol use:  No                                Counseling given: Not Answered    Vital Signs (Current):    BP: 112/66 Pulse: 72   Resp: 16 SpO2: 96   Temp: 96.9 °F (36.1 °C)   Height: 6' (1.829 m)  (08/18/21) Weight: 255 lb 11.2 oz (116 kg)  (08/19/21)   BMI: 34.68            08/18/21 2244 08/18/21 2318 08/19/21 0236 08/19/21 0303   BP:   111/75    Pulse:   79    Resp:  24 22 20   Temp:   98.1 °F (36.7 °C)    TempSrc:   Axillary    SpO2: 97%  95%    Weight:   255 lb 11.2 oz (116 kg)    Height:          BP Readings from Last 3 Encounters:   08/19/21 111/75   08/13/21 136/84   09/26/16 150/90     NPO Status: >8 hrs                        BMI:   Wt Readings from Last 3 Encounters:   08/19/21 255 lb 11.2 oz (116 kg)   08/13/21 260 lb (117.9 kg)   08/13/21 260 lb (117.9 kg)     Body mass index is 34.68 kg/m². CBC:    WBC 7.5 08/18/2021    HGB 13.6 08/18/2021    HCT 41.3 08/18/2021     08/18/2021     CMP:     08/18/2021    K 3.8 08/18/2021     08/18/2021    CO2 24 08/18/2021    BUN 24 08/18/2021    CREATININE 1.3 08/18/2021    GFRAA >60 08/18/2021    GLUCOSE 96 08/18/2021    PROT 7.2 08/18/2021    PROT 7.1 08/15/2012    CALCIUM 10.5 08/18/2021    BILITOT 0.5 08/18/2021    ALKPHOS 101 08/18/2021    AST 26 08/18/2021    ALT 23 08/18/2021     POC Tests:     08/18/21  0733   POCGLU 131     Coags:    PROTIME 18.0 08/18/2021    INR 1.57 08/18/2021    APTT 39.6 08/19/2021     COVID-19 Screening (If Applicable):     COVID19 Not Detected 08/12/2021     Anesthesia Evaluation  Patient summary reviewed and Nursing notes reviewed  Airway: Mallampati: III  TM distance: >3 FB   Neck ROM: limited  Comment: Full/heavy beard and thick neck girth  Mouth opening: > = 3 FB Dental:          Pulmonary: breath sounds clear to auscultation  (+) COPD:  shortness of breath: chronic and no interval change,  sleep apnea: on CPAP,                             Cardiovascular:  Exercise tolerance: good (>4 METS),   (+) hypertension:, valvular problems/murmurs: MR, CAD: non-obstructive, dysrhythmias: atrial fibrillation, CHF: systolic, pulmonary hypertension: mild,     (-) CABG/stent    ECG reviewed  Rhythm: irregular  Rate: normal                    Neuro/Psych:   (+) CVA:, psychiatric history:depression/anxiety              ROS comment: Meniere disease GI/Hepatic/Renal:   (+) hiatal hernia, GERD: well controlled, renal disease: CRI, morbid obesity          Endo/Other:    (+) DiabetesType II DM, , blood dyscrasia: anticoagulation therapy:., .                 Abdominal:             Vascular: Other Findings:           Anesthesia Plan      TIVA and MAC     ASA 3       Induction: intravenous. Anesthetic plan and risks discussed with patient. Plan discussed with CRNA.             Bernabe Covarrubias MD

## 2021-08-19 NOTE — PROGRESS NOTES
Over the phone report given to Kenan Swanson RN PCU   Pt is alert and oriented, he is using supplemental o2 4 liters

## 2021-08-19 NOTE — PROGRESS NOTES
Patient taken from floor in stable condition to procedural area, will assess upon return.  Karina Singh RN

## 2021-08-19 NOTE — FLOWSHEET NOTE
08/18/21 2115   Vital Signs   Temp 97 °F (36.1 °C)   Temp Source Oral   Pulse 84   Heart Rate Source Monitor   Resp 24   /69   BP Location Left upper arm   Patient Position High fowlers   Level of Consciousness Alert (0)   MEWS Score 3   Patient Currently in Pain Denies   Oxygen Therapy   SpO2 97 %   Pulse Oximeter Device Mode Continuous   Pulse Oximeter Device Location Finger   O2 Device None (Room air)   O2 Flow Rate (L/min) 0 L/min   Height and Weight   Weight 255 lb 12.8 oz (116 kg)   Weight Method Actual;Standing scale   BMI (Calculated) 34.8     Patient admitted to room 0306 from [place] [place] [facility]. Patient oriented to room, call light, bed rails, phone, lights and bathroom. Patient instructed about the schedule of the day including: vital sign frequency, lab draws, possible tests, frequency of MD and staff rounds, daily weights, I &O's and prescribed diet. Bed alarm in place, patient aware of placement and reason. Telemetry box in place, patient aware of placement and reason. Bed locked, in lowest position, side rails up 2/4, call light within reach. Recliner Assessment  Patient is able to demonstrate the ability to move from a reclining position to an upright position within the recliner. 4 Eyes Skin Assessment     The patient is being assess for   Admission    I agree that 2 RN's have performed a thorough Head to Toe Skin Assessment on the patient. ALL assessment sites listed below have been assessed. Areas assessed for pressure by both nurses:   [x]   Head, Face, and Ears   [x]   Shoulders, Back, and Chest, Abdomen  [x]   Arms, Elbows, and Hands   [x]   Coccyx, Sacrum, and Ischium  [x]   Legs, Feet, and Heels        Skin Assessed Under all Medical Devices by both nurses:  O2 device tubing              Co-signer eSignature: Electronically signed by Xi Aguilar RN on 8/18/21 at 10:08 PM EDT    Does the Patient have Skin Breakdown related to pressure?   No          Filiberto Prevention initiated: NA   Wound Care Orders initiated:  NA      WOC nurse consulted for Pressure Injury (Stage 3,4, Unstageable, DTI, NWPT, Complex wounds)and New or Established Ostomies:  NA      Primary Nurse eSignature: Electronically signed by Alex Pereira RN on 8/18/21 at 9:53 PM EDT

## 2021-08-19 NOTE — PROGRESS NOTES
Patient had second loose watery stool this evening with abdominal discomfort, night shift nurses alerted at shift change of onset of diarrhea.   Wesley Bokyin RN

## 2021-08-19 NOTE — H&P
Hospital Medicine History & Physical      PCP: No primary care provider on file. Date of Admission: 8/18/2021    Date of Service: Pt seen/examined on 08/18/21 and Admitted to Inpatient with expected LOS greater than two midnights due to medical therapy     Chief Complaint:  Fast irregular heart rate     History Of Present Illness:       72 y.o. male who presented to [facility] as a transfer from [facility]. ED staff noted him to be a poor historian. Ciaran Womack was called by the [place] today and as there was a problem with communication over the phone he went in to have a conversation in person. While there they checked his vitals and found his HR to be fast and irregular. Subsequently sent to the ED where afib with RVR was confirmed. He was transferred to [facility] for treatment. Otherwise he has not active complaints. No palpitations, no chest pain, no headache, no vomiting, no abdominal pain. He admits to some dyspnea but reports this is at baseline for him. He reports that he accumulates water in his body and takes a diuretic for it. He repots a weight of recently 258 lbs for him - which he reports is down from prior and that he has been losing weight lately. He reports being on a blood thinner but doesn't seem to know what its called or why. SocHx:   - he denies drinking or smoking     Past Medical History:          Diagnosis Date    Antral gastritis 4/21/11    with possible gastroparesis    Bronchitis chronic     CHF (congestive heart failure) (HCC)     Chronic abdominal pain     saw Dr. Mayra Dennison on 9/21/12; EGD, col., CT, RUQ U/S, and HIDA unrem.  Chronic back pain     COPD     PFT's show no obstr. or restr. defect    Coronary artery disease.  [facility] 8/12/21 moderate non-obstructive CAD.  8/16/2021    Diabetes mellitus ([address]     Emphysema of lung (HCC)     mild,, bullous    GERD (gastroesophageal reflux disease)     Headache(784.0)     Hiatal hernia 4/21/11    Hyperlipidemia Neg. Cor. Angio. (per 11 o.v. note) & neg. . Echo with Myoview (per 2/25/10 note)    Hypertension     ?  Impotence     Meniere disease     MONICA (obstructive sleep apnea) 10/2/2010    severe; AHI-47; on CPAP pressure of 18    Peripheral vascular disease (HCC)     Pulmonary nodule     Pulmonary nodule     Rash     Renal cyst     small, 1 on each kidney per CT Abd. 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 Petey Roper cerebral artery occlusion with cerebral infarction     Vitamin D deficiency 2012    6 ng/mL       Past Surgical History:          Procedure Laterality Date    AMPUTATION      right index    COLONOSCOPY  2011    ENDOSCOPY, COLON, DIAGNOSTIC      EXTERNAL EAR SURGERY  3/29/2012    Dr. Peewee Dixon; benign squamous papilloma    SPIROMETRY  2009    Total lung capacity improvement over  PFT    UPPER GASTROINTESTINAL ENDOSCOPY  11     Dr. Ganesh Nava; antral gastritis, hiatal hernia, & possible gastroparesis       Medications Prior to Admission:      Prior to Admission medications    Medication Sig Start Date End Date Taking? Authorizing Provider   amLODIPine (NORVASC) 10 MG tablet Take 10 mg by mouth daily   Yes Historical Provider, MD   apixaban (ELIQUIS) 5 MG TABS tablet Take 5 mg by mouth 2 times daily   Yes Historical Provider, MD   atorvastatin (LIPITOR) 40 MG tablet Take 1 tablet by mouth daily  Patient taking differently: Take 20 mg by mouth daily Take 1/2 tab daily 21  Yes Preston Travis DO   losartan (COZAAR) 25 MG tablet Take 0.5 tablets by mouth daily 21  Yes Preston Travis, DO   furosemide (LASIX) 40 MG tablet Take 1 tablet by mouth daily 21  Yes Preston Travis, DO   metoprolol succinate (TOPROL XL) 50 MG extended release tablet Take 1 tablet by mouth daily Take 2 tablets by mouth (100 mg) every morning and one tablet by mouth (50 mg) every evening.   Patient taking differently: Take 25 mg by mouth 2 times daily  8/13/21  Yes Preston Haddox, DO   budesonide (PULMICORT) 180 MCG/ACT AEPB inhaler Inhale 2 puffs into the lungs 2 times daily   Yes Historical Provider, MD   glipiZIDE (GLUCOTROL) 5 MG tablet Take 5 mg by mouth 2 times daily (before meals) Take 2 tabs before breakfast and 1 before supper   Yes Historical Provider, MD   metFORMIN (GLUCOPHAGE) 1000 MG tablet Take 1,000 mg by mouth 2 times daily (with meals)   Yes Historical Provider, MD   SEMAGLUTIDE, 1 MG/DOSE, SC Inject 0.25 mg into the skin once a week   Yes Historical Provider, MD   glycerin-hypromellose- 0.2-0.2-1 % SOLN opthalmic solution Place 2 drops into both eyes 4 times daily   Yes Historical Provider, MD   omeprazole (PRILOSEC) 20 MG delayed release capsule Take 20 mg by mouth daily   Yes Historical Provider, MD   Cholecalciferol (VITAMIN D) 50 MCG (2000 UT) CAPS capsule Take 1 capsule by mouth daily   Yes Historical Provider, MD       Allergies:  Patient has no known allergies. Social History:      The patient currently lives at home     TOBACCO:   reports that he quit smoking about 25 years ago. His smoking use included cigarettes. He has a 75.00 pack-year smoking history. He has never used smokeless tobacco.  ETOH:   reports no history of alcohol use. E-Cigarettes/Vaping Use     Questions Responses    E-Cigarette/Vaping Use     Start Date     Passive Exposure     Quit Date     Counseling Given     Comments             Family History:             Problem Relation Age of Onset    Emphysema Father     Diabetes Father     Heart Failure Father     Heart Disease Father         chf    Asthma Maternal Grandmother     Stroke Mother [de-identified]    Cancer Brother 54        pancreatic    Diabetes Daughter     Asthma Daughter     Cancer Daughter     Hypertension Neg Hx        REVIEW OF SYSTEMS COMPLETED:   Pertinent positives as noted in the HPI. All other systems reviewed and negative.     PHYSICAL EXAM PERFORMED:    /69   Pulse 84 Temp 97 °F (36.1 °C) (Oral)   Resp 24   Ht 6' (1.829 m)   Wt 255 lb 12.8 oz (116 kg)   SpO2 97%   BMI 34.69 kg/m²     General appearance:  No apparent distress, appears stated age and cooperative. HEENT:  Normal cephalic, atraumatic without obvious deformity. Pupils equal, round, and reactive to light. Extra ocular muscles intact. Conjunctivae/corneas clear. Neck: Supple, with full range of motion. Trachea midline. Respiratory:  Normal respiratory effort. Clear to auscultation, bilaterally without Rales/Wheezes/Rhonchi. Cardiovascular:  Irregular, tachycardic, with normal S1/S2 without murmurs, rubs or gallops. Abdomen: Soft, non-tender, non-distended   Musculoskeletal:  No clubbing   - trace leg edema   Skin: Skin  Warm, dry   Neurologic:    Speech clear   No facial droop  moves ext x 4   Psychiatric:  Alert and oriented   Capillary Refill: Brisk,3 seconds, normal  Peripheral Pulses: +2 palpable, equal bilaterally       Labs:     Recent Labs     08/18/21  1745   WBC 7.5   HGB 13.6   HCT 41.3        Recent Labs     08/18/21  1745      K 3.8      CO2 24   BUN 24*   CREATININE 1.3   CALCIUM 10.5     Recent Labs     08/18/21  1745   AST 26   ALT 23   BILITOT 0.5   ALKPHOS 101     Recent Labs     08/18/21  1745   INR 1.57*     No results for input(s): Jose [place] in the last 72 hours. Urinalysis:      Lab Results   Component Value Date    BLOODU neg 04/17/2012    SPECGRAV 1.025 04/17/2012    GLUCOSEU neg 04/17/2012       Radiology:        XR CHEST PORTABLE   Final Result   No acute cardiopulmonary process.              ASSESSMENT:    Active Hospital Problems    Diagnosis Date Noted    Atrial fibrillation with rapid ventricular response ([address] [I48.91] 08/18/2021    Atrial fibrillation with RVR ([address] [I48.91] 08/10/2021     Gigi Siu is a 72 y.o. male     Atrial fibrillation with rapid ventricular response:   - hx of CAD, [facility] 8/12/21 moderate non-obstructive CAD  - diltiazem drip   - apixaban 5 mg po BID   - metoprolol 100 mg po daily   - furosemide 40 mg po daily   - cardiology consulted   - TSH was just checked 8/10/21 and was normal     DM2:   - glipizide 5 mg po BID   - metformin 1 gm po BID   - SSI     MONICA:   - continue CPAP    Hypertension:   - amlodipine 10 mg po daily   - losaratan 12.5 mg po daily     Dyslipidemia:   - atorvastatin 20 mg po daily     GI cramps:   - dicyclomine 10 mg po QID     GERD:   - continue PPI     Supplement:   - vitamin D po daily       DVT Prophylaxis: apixaban  Diet: Diet NPO Exceptions are: Ice Chips, Sips of Water with Meds  Code Status: Full Code    PT/OT Eval Status: not consulted     Nhan Soto - pending improvement        Hilary Vasquez MD    Thank you No primary care provider on file. for the opportunity to be involved in this patient's care.

## 2021-08-19 NOTE — CARE COORDINATION
Case Management Assessment  Initial Evaluation      Patient Name: Nina Morales  YOB: 1950  Diagnosis: Atrial fibrillation with rapid ventricular response ([address] [I48.91]  Atrial fibrillation with RVR ([address] [I48.91]  Date / Time: 8/18/2021  5:00 PM    Admission status/Date:INPT 8/18/21  Chart Reviewed: Yes      Patient Interviewed: Yes   Family Interviewed:  No      Hospitalization in the last 30 days:  Yes      Health Care Decision Maker : Bebo Kelly, dtr: [phone]  Ilsa Marion, son: [phone]     (CM - must 1st enter selection under Navigator - emergency contact- [address] Relationship and pick relationship)   Who do you trust or have selected to make healthcare decisions for you      Met with: pt via telephone  Rima Rudd conducted  (bedside/phone):    Current PCP: SHARAD rasconSun'aq    [facility]  [facility]  Precert required for SNF : Y, N          3 night stay required - Y, N    ADLS  Support Systems/Care Needs: Friends/Neighbors, Family Members  Transportation: self    Meal Preparation: self    Housing  Living Arrangements: home alone  Steps: 3  Intent for return to present living arrangements: Yes  Identified Issues: -    Home Care Information  Active with [address] : No Agency:(Services)  Type of Home Care Services: None  Passport/Waiver : No  :                      Phone Number:    Passport/Waiver Services: -          Durable Medical Equiptment   D[facility] Provider: [facility]  Equipment: CPAP  Walker___Cane___RTS___ BSC___Shower Chair___Hospital Bed___W/C____Other________  02 at ____Liter(s)---wears(frequency)_______ HHN ___ CPAP at noc___ BiPap___   N/A____      Home O2 Use :  No    If No for home O2---if presently on O2 during hospitalization:  No  if yes CM to follow for potential DC O2 need  Informed of need for care provider to bring portable home O2 tank on day of discharge for nursing to connect prior to leaving:   Not Indicated  Verbalized agreement/Understanding:   Not Indicated    Community Service Affiliation  Dialysis:  No    · Agency:  · Location:  · Dialysis Schedule:  · Phone:   · Fax: Other Community Services: (ex:PT/OT,Mental Health,Wound Clinic, Cardio/Pul [address])    DISCHARGE PLAN: Explained Case Management role/services. Reviewed chart and spoke with pt who states that he is IPTA and lives at home alone. Pt states that he plans to return home at discharge and denies need for hhc or other services at this time. CM will cont to follow.

## 2021-08-20 VITALS
SYSTOLIC BLOOD PRESSURE: 110 MMHG | OXYGEN SATURATION: 93 % | BODY MASS INDEX: 34.48 KG/M2 | RESPIRATION RATE: 18 BRPM | TEMPERATURE: 97.5 F | HEIGHT: 72 IN | WEIGHT: 254.6 LBS | DIASTOLIC BLOOD PRESSURE: 61 MMHG | HEART RATE: 85 BPM

## 2021-08-20 LAB
ANION GAP SERPL CALCULATED.3IONS-SCNC: 9 MMOL/L (ref 3–16)
APTT: 37.2 SEC (ref 26.2–38.6)
APTT: 38.4 SEC (ref 26.2–38.6)
BUN BLDV-MCNC: 24 MG/DL (ref 7–20)
CALCIUM SERPL-MCNC: 9.6 MG/DL (ref 8.3–10.6)
CHLORIDE BLD-SCNC: 108 MMOL/L (ref 99–110)
CO2: 22 MMOL/L (ref 21–32)
CREAT SERPL-MCNC: 1.2 MG/DL (ref 0.8–1.3)
GFR AFRICAN AMERICAN: >60
GFR NON-AFRICAN AMERICAN: 60
GLUCOSE BLD-MCNC: 110 MG/DL (ref 70–99)
GLUCOSE BLD-MCNC: 69 MG/DL (ref 70–99)
GLUCOSE BLD-MCNC: 75 MG/DL (ref 70–99)
HCT VFR BLD CALC: 39.4 % (ref 40.5–52.5)
HEMOGLOBIN: 12.7 G/DL (ref 13.5–17.5)
MAGNESIUM: 1.8 MG/DL (ref 1.8–2.4)
MCH RBC QN AUTO: 29.9 PG (ref 26–34)
MCHC RBC AUTO-ENTMCNC: 32.2 G/DL (ref 31–36)
MCV RBC AUTO: 92.8 FL (ref 80–100)
PDW BLD-RTO: 14.1 % (ref 12.4–15.4)
PERFORMED ON: ABNORMAL
PERFORMED ON: NORMAL
PLATELET # BLD: 170 K/UL (ref 135–450)
PMV BLD AUTO: 8.8 FL (ref 5–10.5)
POTASSIUM SERPL-SCNC: 3.9 MMOL/L (ref 3.5–5.1)
RBC # BLD: 4.24 M/UL (ref 4.2–5.9)
SODIUM BLD-SCNC: 139 MMOL/L (ref 136–145)
WBC # BLD: 6.6 K/UL (ref 4–11)

## 2021-08-20 PROCEDURE — 6370000000 HC RX 637 (ALT 250 FOR IP): Performed by: PEDIATRICS

## 2021-08-20 PROCEDURE — 80048 BASIC METABOLIC PNL TOTAL CA: CPT

## 2021-08-20 PROCEDURE — 2580000003 HC RX 258: Performed by: INTERNAL MEDICINE

## 2021-08-20 PROCEDURE — 6370000000 HC RX 637 (ALT 250 FOR IP): Performed by: INTERNAL MEDICINE

## 2021-08-20 PROCEDURE — 94640 AIRWAY INHALATION TREATMENT: CPT

## 2021-08-20 PROCEDURE — 83735 ASSAY OF MAGNESIUM: CPT

## 2021-08-20 PROCEDURE — 85027 COMPLETE CBC AUTOMATED: CPT

## 2021-08-20 PROCEDURE — 99233 SBSQ HOSP IP/OBS HIGH 50: CPT | Performed by: INTERNAL MEDICINE

## 2021-08-20 PROCEDURE — 85730 THROMBOPLASTIN TIME PARTIAL: CPT

## 2021-08-20 PROCEDURE — 6370000000 HC RX 637 (ALT 250 FOR IP): Performed by: EMERGENCY MEDICINE

## 2021-08-20 PROCEDURE — 36415 COLL VENOUS BLD VENIPUNCTURE: CPT

## 2021-08-20 PROCEDURE — 99239 HOSP IP/OBS DSCHRG MGMT >30: CPT | Performed by: INTERNAL MEDICINE

## 2021-08-20 RX ORDER — METOPROLOL SUCCINATE 50 MG/1
50 TABLET, EXTENDED RELEASE ORAL DAILY
Status: DISCONTINUED | OUTPATIENT
Start: 2021-08-20 | End: 2021-08-20 | Stop reason: HOSPADM

## 2021-08-20 RX ORDER — LOPERAMIDE HYDROCHLORIDE 2 MG/1
2 CAPSULE ORAL 4 TIMES DAILY PRN
Status: DISCONTINUED | OUTPATIENT
Start: 2021-08-20 | End: 2021-08-20 | Stop reason: HOSPADM

## 2021-08-20 RX ORDER — FUROSEMIDE 20 MG/1
20 TABLET ORAL DAILY
Status: DISCONTINUED | OUTPATIENT
Start: 2021-08-20 | End: 2021-08-20 | Stop reason: HOSPADM

## 2021-08-20 RX ORDER — METOPROLOL SUCCINATE 50 MG/1
50 TABLET, EXTENDED RELEASE ORAL DAILY
Qty: 30 TABLET | Refills: 1 | Status: SHIPPED | OUTPATIENT
Start: 2021-08-20

## 2021-08-20 RX ORDER — AMIODARONE HYDROCHLORIDE 200 MG/1
TABLET ORAL
Qty: 60 TABLET | Refills: 0 | Status: SHIPPED | OUTPATIENT
Start: 2021-08-20

## 2021-08-20 RX ORDER — FUROSEMIDE 40 MG/1
20 TABLET ORAL DAILY
Qty: 15 TABLET | Refills: 1 | Status: SHIPPED | OUTPATIENT
Start: 2021-08-20

## 2021-08-20 RX ADMIN — GLIPIZIDE 5 MG: 5 TABLET ORAL at 06:05

## 2021-08-20 RX ADMIN — FUROSEMIDE 20 MG: 20 TABLET ORAL at 09:16

## 2021-08-20 RX ADMIN — Medication 10 ML: at 09:17

## 2021-08-20 RX ADMIN — LOSARTAN POTASSIUM 50 MG: 25 TABLET, FILM COATED ORAL at 09:16

## 2021-08-20 RX ADMIN — Medication 2000 UNITS: at 09:16

## 2021-08-20 RX ADMIN — LOPERAMIDE HYDROCHLORIDE 2 MG: 2 CAPSULE ORAL at 05:11

## 2021-08-20 RX ADMIN — ATORVASTATIN CALCIUM 40 MG: 40 TABLET, FILM COATED ORAL at 09:17

## 2021-08-20 RX ADMIN — METOPROLOL SUCCINATE 50 MG: 50 TABLET, EXTENDED RELEASE ORAL at 09:17

## 2021-08-20 RX ADMIN — DICYCLOMINE HYDROCHLORIDE 10 MG: 10 CAPSULE ORAL at 06:05

## 2021-08-20 RX ADMIN — METFORMIN HYDROCHLORIDE 1000 MG: 500 TABLET ORAL at 09:16

## 2021-08-20 RX ADMIN — Medication 2 PUFF: at 07:29

## 2021-08-20 RX ADMIN — APIXABAN 5 MG: 5 TABLET, FILM COATED ORAL at 09:16

## 2021-08-20 RX ADMIN — CARBOXYMETHYLCELLULOSE SODIUM 2 DROP: 10 GEL OPHTHALMIC at 09:17

## 2021-08-20 RX ADMIN — PANTOPRAZOLE SODIUM 40 MG: 40 TABLET, DELAYED RELEASE ORAL at 05:11

## 2021-08-20 RX ADMIN — AMIODARONE HYDROCHLORIDE 200 MG: 200 TABLET ORAL at 09:16

## 2021-08-20 NOTE — DISCHARGE SUMMARY
Name:  Mona Cox  Room:  /9352-05  MRN:    [id number]    Discharge Summary      This discharge summary is in conjunction with a complete physical exam done on the day of discharge. Attending Physician: Dr. Mee Vazquez  Discharging Physician: Dr. Shelley Kruger: 8/18/2021  Discharge:  8/20/2021    HPI:  72 y.o. male who presented to [facility] as a transfer from [facility] ED. ED staff noted him to be a poor historian. Roberto Arevalo was called by the [place] today and as there was a problem with communication over the phone he went in to have a conversation in person. While there they checked his vitals and found his HR to be fast and irregular. Subsequently sent to the ED where afib with RVR was confirmed. He was transferred to [facility] for treatment. Otherwise he has not active complaints. No palpitations, no chest pain, no headache, no vomiting, no abdominal pain. He admits to some dyspnea but reports this is at baseline for him. He reports that he accumulates water in his body and takes a diuretic for it. He repots a weight of recently 258 lbs for him - which he reports is down from prior and that he has been losing weight lately. He reports being on a blood thinner but doesn't seem to know what its called or why.      SocHx:   - he denies drinking or smoking     Diagnoses this Admission and Hospital Course   Atrial fibrillation with RVR   - HR in the 170's on arrival to the ER   -  Given diltiazem bolus and started on gtt.   - Tele monitoring   - [id number]: 4, on Eliquis  - Echo shows EF is 45%  - Cardiology consulted  -Underwent YUKI and cardioversion 8/19  Has been in sinus rhythm since  Discharged on amiodarone 200 twice daily for 4 weeks and 200 daily thereafter and Toprol-XL 50 mg daily      Hypomagnesemia  - replaced electrolytes.   Monitored labs.      DM2  - continued home oral Rx   - Continued SSI   - BG is well controlled         HLD  - Continued Atorvastatin      Hypertension  - BP is controlled   - Continued Cozaar, Toprol-XL        Chronic CHF -systolic  - ECHO noted. Not in acute CHF  Lasix decreased to 20 mg daily on discharge by cardiology     COPD   - No AE   - Continued home inhalers PRN     MONICA  - home CPAP     DVT Prophylaxis: Eliquis    Procedures (Please Review Full Report for Details)  YUKI and cardioversion     Consults    Cardiology       Physical Exam at Discharge:    /61   Pulse 85   Temp 97.5 °F (36.4 °C) (Oral)   Resp 18   Ht 6' (1.829 m)   Wt 254 lb 9.6 oz (115.5 kg)   SpO2 93%   BMI 34.53 kg/m²     See today's progress note    CBC: Recent Labs     08/18/21  1745 08/20/21  0528   WBC 7.5 6.6   HGB 13.6 12.7*   HCT 41.3 39.4*   MCV 90.5 92.8    170     BMP:   Recent Labs     08/18/21  1745 08/19/21  0553 08/20/21  0528    141 139   K 3.8 4.0 3.9    105 108   CO2 24 24 22   BUN 24* 23* 24*   CREATININE 1.3 1.1 1.2     LIVER PROFILE:   Recent Labs     08/18/21  1745   AST 26   ALT 23   BILITOT 0.5   ALKPHOS 101     PT/INR:   Recent Labs     08/18/21  1745   PROTIME 18.0*   INR 1.57*     APTT:   Recent Labs     08/19/21  1729 08/20/21  0011 08/20/21  0528   APTT 38.6 38.4 37.2         CULTURES  COVID: not detected    RADIOLOGY  XR CHEST PORTABLE   Final Result   No acute cardiopulmonary process.            Echo 8/11/2021   Summary   3/21/2012 Normal EF, LAE, mild MR, TR SPAP 40.5 mmHg.   Left ventricular systolic function is mildly reduced to 45%.   There is mild global hypokinesis.   Left ventricular size is mildly increased.   There is mild concentric left ventricular hypertrophy.   Normal left ventricular diastolic filling pressure.   No evidence of mitral valve stenosis.   Moderate mitral regurgitation.   The left atrium is mildly dilated.   The aortic root is mildly dilated at 4.1cm.   Moderate tricuspid regurgitation.   Systolic pulmonary artery pressure (SPAP) estimated at 47 mmHg (RA pressure   15 mmHg), consistent with mild pulmonary hypertension.   The right atrium is mildly dilated. Impression:  1. Moderate non-obstructive coronary artery disease. 2. Non-ischemic cardiomyopathy.  May be tachycardia-induced in setting of atrial   fibrillation with RVR.     Stress test 8/12/21  Summary Small area, moderately severe, reversible perfusion defect involving the  nader-apical, apical segments.  Changes fail to resolve with [facility] imaging.  Findings are consistent with inducible ischemia, distal LAD territory.  Global hypokinesis with post-stress LVEF 39%.   Overall findings represent a high risk scan.        Discharge Medications     Medication List      START taking these medications    amiodarone 200 MG tablet  Commonly known as: CORDARONE  200 bid- 4 weeks, 200 daily thereafter        CHANGE how you take these medications    atorvastatin 40 MG tablet  Commonly known as: LIPITOR  Take 1 tablet by mouth daily  What changed:   · how much to take  · additional instructions     furosemide 40 MG tablet  Commonly known as: LASIX  Take 0.5 tablets by mouth daily  What changed: how much to take     metoprolol succinate 50 MG extended release tablet  Commonly known as: TOPROL XL  Take 1 tablet by mouth daily  What changed: additional instructions        CONTINUE taking these medications    budesonide 180 MCG/ACT Aepb inhaler  Commonly known as: PULMICORT     Eliquis 5 MG Tabs tablet  Generic drug: apixaban     glipiZIDE 5 MG tablet  Commonly known as: GLUCOTROL     glycerin-hypromellose- 0.2-0.2-1 % Soln opthalmic solution     losartan 25 MG tablet  Commonly known as: COZAAR  Take 0.5 tablets by mouth daily     metFORMIN 1000 MG tablet  Commonly known as: GLUCOPHAGE     omeprazole 20 MG delayed release capsule  Commonly known as: PRILOSEC     SEMAGLUTIDE (1 MG/DOSE) SC     vitamin D 50 MCG (2000 UT) Caps capsule        STOP taking these medications    amLODIPine 10 MG tablet  Commonly known as: NORVASC           Where to Get Your Medications These medications were sent to [facility]/pharmacy #[id number] D[facility]illa, [address], [address] [facility] [address]    Phone: [phone]   · amiodarone 200 MG tablet  · furosemide 40 MG tablet  · metoprolol succinate 50 MG extended release tablet           Discharged in stable condition to home. Follow Up: Follow up with PCP and cardiology. RIGOBERTO Lundberg.

## 2021-08-20 NOTE — PLAN OF CARE
Nurse reports that patient had multiple loose stool overnight  Patient thinks is from magnesium replacement  Patient started on as needed Imodium  Further treatment plan per dayshift hospitalist.    Yonatan Diaz MD  08/20/21 6:18 AM

## 2021-08-20 NOTE — H&P
Brief Pre-Op Note/Sedation Assessment      Hilaria Polanco  1950  Cath Pool Rm/NONE      [id number]  8:32 PM    Planned Procedure: Cardiac Catheterization Procedure    Post Procedure Plan: Return to same level of care    Consent: I have discussed with the patient and/or the patient representative the indication, alternatives, and the possible risks and/or complications of the planned procedure and the anesthesia methods. The patient and/or patient representative appear to understand and agree to proceed. Chief Complaint: Exertional dyspnea, abnormal nuclear SPECT stress test, cardiomyopathy      Indications for Cath Procedure: Worsening Angina, Suspected CAD, Cardiomyopathy and LV Dysfunction  Anginal Classification within 2 weeks:  CCS III - Symptoms with everyday living activities, i.e., moderate limitation  NYHA Heart Failure Class within 2 weeks: Class III - Symptoms of HF on less-than-ordinary exertion, Newly Diagnosed? Yes, Heart Failure Type: Systolic  Is Cath Lab Visit Valve-related?: No  Surgical Risk: Intermediate  Functional Type: < 4 METS    Anti- Anginal Meds within 2 weeks:   Yes: Beta Blockers, Aspirin and Statin (Any)    Stress or Imaging Studies Performed (within 6 months):  Stress Test with SPECT Result: Positive:  apical Risk/Extent of Ischemia:  Intermediate     Vital Signs:  Ht 6' (1.829 m)   Wt 260 lb (117.9 kg)   BMI 35.26 kg/m²     Allergies:  No Known Allergies    Past Medical History:  Past Medical History:   Diagnosis Date    Antral gastritis 4/21/11    with possible gastroparesis    Bronchitis chronic     CHF (congestive heart failure) (HCC)     Chronic abdominal pain     saw Dr. Cirilo Newman on 9/21/12; EGD, col., CT, RUQ U/S, and HIDA unrem.  Chronic back pain     COPD     PFT's show no obstr. or restr. defect    Coronary artery disease.  [facility] 8/12/21 moderate non-obstructive CAD.  8/16/2021    Diabetes mellitus ([address]     Emphysema of lung ([address]     mild,, bullous    GERD (gastroesophageal reflux disease)     Headache(784.0)     Hiatal hernia 11    Hyperlipidemia     Neg. Cor. Angio. (per 11 o.v. note) & neg. . Echo with Myoview (per 2/25/10 note)    Hypertension     ?  Impotence     Meniere disease     MONICA (obstructive sleep apnea) 10/2/2010    severe; AHI-47; on CPAP pressure of 18    Peripheral vascular disease (HCC)     Pulmonary nodule     Pulmonary nodule     Rash     Renal cyst     small, 1 on each kidney per CT Abd. 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 Alan Schools cerebral artery occlusion with cerebral infarction     Vitamin D deficiency 2012    6 ng/mL         Surgical History:  Past Surgical History:   Procedure Laterality Date    AMPUTATION      right index    CARDIOVERSION N/A 2021    CARDIOVERSION performed by Fahad Escobar MD at [address] [place] [address]  2011    ENDOSCOPY, COLON, DIAGNOSTIC      EXTERNAL EAR SURGERY  3/29/2012    Dr. Thomas Roberto; benign squamous papilloma    SPIROMETRY  2009    Total lung capacity improvement over  PFT    TRANSESOPHAGEAL ECHOCARDIOGRAM  2021    with Cardioversion    TRANSESOPHAGEAL ECHOCARDIOGRAM N/A 2021    TRANSESOPHAGEAL ECHOCARDIOGRAM performed by Fahad Escobar MD at [place] [address]  11     Dr. Hemalatha Rodrigez; antral gastritis, hiatal hernia, & possible gastroparesis         Medications:  No current facility-administered medications for this encounter. No current outpatient medications on file.      Facility-Administered Medications Ordered in Other Encounters   Medication Dose Route Frequency Provider Last Rate Last Admin    sodium chloride flush 0.9 % injection 5-40 mL  5-40 mL Intravenous 2 times per day Fahad Escobar MD        sodium chloride flush 0.9 % injection 5-40 mL  5-40 mL Intravenous PRN Fahad Escobar MD        0.9 % sodium chloride infusion  25 mL Intravenous PRN Flori Loza MD   Stopped at 08/19/21 1342    meperidine (DEMEROL) injection 12.5 mg  12.5 mg Intravenous Q5 Min PRN Laure Orosco MD        fentaNYL (SUBLIMAZE) injection 25 mcg  25 mcg Intravenous Q5 Min PRN Laure Orosco MD        HYDROmorphone (DILAUDID) injection 0.5 mg  0.5 mg Intravenous Q5 Min PRN Laure Orosco MD        HYDROmorphone (DILAUDID) injection 0.25 mg  0.25 mg Intravenous Q5 Min PRN Laure Orosco MD        HYDROmorphone (DILAUDID) injection 0.5 mg  0.5 mg Intravenous Q5 Min PRN Laure Orosco MD        oxyCODONE-acetaminophen (PERCOCET) 5-325 MG per tablet 1 tablet  1 tablet Oral PRN Laure Orosco MD        Or    oxyCODONE-acetaminophen (PERCOCET) 5-325 MG per tablet 2 tablet  2 tablet Oral PRN Laure Orosco MD        ondansetron [facility]) injection 4 mg  4 mg Intravenous Once PRN Laure Orosco MD        promethazine [facility]) injection 6.25 mg  6.25 mg Intramuscular Once PRN Laure Orosco MD        hydrALAZINE (APRESOLINE) injection 5 mg  5 mg Intravenous Q10 Min PRN Laure Orosco MD        [START ON 8/20/2021] atorvastatin (LIPITOR) tablet 40 mg  40 mg Oral Daily Flori Loza MD        [START ON 8/20/2021] losartan (COZAAR) tablet 50 mg  50 mg Oral Daily Flori Loza MD        amiodarone (CORDARONE) tablet 200 mg  200 mg Oral BID Flori Loza MD   200 mg at 08/19/21 1130    [START ON 8/20/2021] pantoprazole (PROTONIX) tablet 40 mg  40 mg Oral QAM AC Jimmy Gonzalez MD        apixaban (ELIQUIS) tablet 5 mg  5 mg Oral BID Jessie Llamas MD   5 mg at 08/19/21 1115    perflutren lipid microspheres (DEFINITY) injection 1.65 mg  1.5 mL Intravenous ONCE PRN Kieran Sepulveda MD        0.9 % sodium chloride infusion  25 mL Intravenous PRN Kieran Sepulveda MD        acetaminophen (TYLENOL) tablet 650 mg  650 mg Oral Q6H PRN Kieran Sepulveda MD        Or    acetaminophen (TYLENOL) at 08/19/21 1658    Vitamin D (CHOLECALCIFEROL) tablet 2,000 Units  2,000 Units Oral Daily Jia Nance MD   2,000 Units at 08/19/21 1116    fluticasone (FLOVENT HFA) 110 MCG/ACT inhaler 2 puff  2 puff Inhalation BID Jia Nance MD   2 puff at 08/19/21 2009           Pre-Sedation:    Pre-Sedation Documentation and Exam:  I have personally completed a history, physical exam & review of systems for this patient (see notes). Prior History of Anesthesia Complications:   none    Modified Mallampati:  II (soft palate, uvula, fauces visible)    ASA Classification:  Class 3 - A patient with severe systemic disease that limits activity but is not incapacitating      Mohan Scale: Activity:  2 - Able to move 4 extremities voluntarily on command  Respiration:  2 - Able to breathe deeply and cough freely  Circulation:  2 - BP+/- 20mmHg of normal  Consciousness:  2 - Fully awake  Oxygen Saturation (color):  2 - Able to maintain oxygen saturation >92% on room air    Sedation/Anesthesia Plan:  Guard the patient's safety and welfare. Minimize physical discomfort and pain. Minimize negative psychological responses to treatment by providing sedation and analgesia and maximize the potential amnesia. Patient to meet pre-procedure discharge plan.     Medication Planned:  midazolam intravenously and fentanyl intravenously    Patient is an appropriate candidate for plan of sedation: yes      Electronically signed by Nikhil Travis DO on 8/19/2021 at 8:32 PM

## 2021-08-20 NOTE — PROGRESS NOTES
Progress Note    Admit Date:  8/18/2021    72year old male with a-fib, CHF, CAD, COPD, DM, GERD, hypertension, hyperlipidemia, MONICA, PVD presented to [facility] for a-fib with RVR. Admitted to PCU on telemetry. Cardiology consulted. Subjective:  Mr. Dennie Bucker stayed in sinus rhythm all night. Blood sugar was low at 76 this morning. Objective:   Patient Vitals for the past 4 hrs:   SpO2   08/20/21 0731 94 %            Intake/Output Summary (Last 24 hours) at 8/20/2021 0824  Last data filed at 8/19/2021 1803  Gross per 24 hour   Intake 2641.52 ml   Output 1000 ml   Net 1641.52 ml       Physical Exam:  Gen: No distress. Alert. Eyes: PERRL. No sclera icterus. No conjunctival injection. ENT: No discharge. Pharynx clear. Neck:  Trachea midline. Resp: No accessory muscle use. No crackles. No wheezes. No rhonchi. CV: regular rate. Irregular rhythm. No murmur. No rub. No edema. Capillary Refill: Brisk,< 3 seconds   Peripheral Pulses: +2 palpable, equal bilaterally   GI: Non-tender. Non-distended. Normal bowel sounds. No hernia. Skin: Warm and dry. No nodule on exposed extremities. No rash on exposed extremities. M/S: No cyanosis. No joint deformity. No clubbing. Neuro: Awake. Grossly nonfocal    Psych: Oriented x 3. No anxiety or agitation          Data:  CBC:   Recent Labs     08/18/21 1745 08/20/21  0528   WBC 7.5 6.6   HGB 13.6 12.7*   HCT 41.3 39.4*   MCV 90.5 92.8    170     BMP:   Recent Labs     08/18/21  1745 08/19/21  0553 08/20/21  0528    141 139   K 3.8 4.0 3.9    105 108   CO2 24 24 22   BUN 24* 23* 24*   CREATININE 1.3 1.1 1.2     LIVER PROFILE:   Recent Labs     08/18/21  1745   AST 26   ALT 23   BILITOT 0.5   ALKPHOS 101     PT/INR:   Recent Labs     08/18/21 1745   PROTIME 18.0*   INR 1.57*       CULTURES  COVID: pending    RADIOLOGY  XR CHEST PORTABLE   Final Result   No acute cardiopulmonary process.            Echo 8/11/2021   Summary   3/21/2012 Normal EF, LAE, mild MR, TR SPAP 40.5 mmHg. Left ventricular systolic function is mildly reduced to 45%. There is mild global hypokinesis. Left ventricular size is mildly increased. There is mild concentric left ventricular hypertrophy. Normal left ventricular diastolic filling pressure. No evidence of mitral valve stenosis. Moderate mitral regurgitation. The left atrium is mildly dilated. The aortic root is mildly dilated at 4.1cm. Moderate tricuspid regurgitation. Systolic pulmonary artery pressure (SPAP) estimated at 47 mmHg (RA pressure   15 mmHg), consistent with mild pulmonary hypertension. The right atrium is mildly dilated. Impression:  1. Moderate non-obstructive coronary artery disease. 2. Non-ischemic cardiomyopathy.  May be tachycardia-induced in setting of atrial   fibrillation with RVR.     Stress test 8/12/21  Summary Small area, moderately severe, reversible perfusion defect involving the  nader-apical, apical segments. Changes fail to resolve with [facility] imaging.  Findings are consistent with inducible ischemia, distal LAD territory.  Global hypokinesis with post-stress LVEF 39%.   Overall findings represent a high risk scan.        Assessment/Plan:  Atrial fibrillation with RVR   - HR in the 170's on arrival to the ER   -  Given diltiazem bolus and started on gtt.   - Tele monitor   - [id number]: 4, on Eliquis  - Echo shows EF is 45%  - Cardiology consulted  -Underwent YUKI and cardioversion 8/19  Has been in sinus rhythm since  Discharge on amiodarone 200 twice daily for 4 weeks and 200 daily thereafter and Toprol-XL 50 mg daily      Hypomagnesemia  - replace electrolytes. Monitor labs. DM2  - continue home oral Rx   - Continue SSI   - BG is well controlled        HLD  - Continue Atorvastatin      Hypertension  - BP is controlled   - Continue Cozaar, Toprol-XL       Chronic CHF -systolic  - ECHO noted.  Not in acute CHF  Lasix decreased to 20 mg daily on discharge by cardiology     COPD   - No AE   - Continue home inhalers PRN     MONICA  - home CPAP    DVT Prophylaxis: Eliquis  Diet: ADULT DIET; Regular; 4 carb choices (60 gm/meal)  Code Status: Full Code     Discharge home      RIGOBERTO Lundberg.

## 2021-08-20 NOTE — PROGRESS NOTES
[address] Daily Progress Note      Admit Date:  2021    Subjective:  Mr. Nathalie Lunsford is seen for cardiology follow up  For afib RVR  He underwent YUKI DC cardioversion yesterday and remains in NSR  Denies chest pain, shortness of breath, edema, dizziness, palpitations and syncope. He is on room air ambulatory  [place] as in my initial consult     ROS:  12 point ROS negative in all areas as listed below except as in [address]  Constitutional, EENT,  pulmonary, GI, , Musculoskeletal, skin, neurological, hematological, endocrine, Psychiatric    Past Medical History:   Diagnosis Date    Antral gastritis 11    with possible gastroparesis    Bronchitis chronic     [facility]congestive heart failure) [facility])     Chronic abdominal pain     saw Dr. Toña Lucio on 12; EGD, col., CT, RUQ U/S, and HIDA unrem.  Chronic back pain     COPD     PFT's show no obstr. or restr. defect    Coronary artery disease. [facility] 21 moderate non-obstructive CAD.  2021    Diabetes mellitus ([address]     Emphysema of lung (HCC)     mild,, bullous    GERD (gastroesophageal reflux disease)     Headache(784.0)     Hiatal hernia 11    Hyperlipidemia     Neg. Cor. Angio. (per 11 o.v. note) & neg. . Echo with Myoview (per 2/25/10 note)    Hypertension     ?     Impotence     Meniere disease     MONICA (obstructive sleep apnea) 10/2/2010    severe; AHI-47; on CPAP pressure of 18    Peripheral vascular disease (HCC)     Pulmonary nodule     Pulmonary nodule     Rash     Renal cyst     small, 1 on each kidney per CT Abd. 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 Peña Atalissa cerebral artery occlusion with cerebral infarction     Vitamin D deficiency 2012    6 ng/mL     Past Surgical History:   Procedure Laterality Date    AMPUTATION      right index    CARDIOVERSION N/A 2021    CARDIOVERSION performed by Shantel Carbone MD at [address] [facility] COLONOSCOPY  9/27/2011    ENDOSCOPY, COLON, DIAGNOSTIC      EXTERNAL EAR SURGERY  3/29/2012    Dr. Julissa Rachel; benign squamous papilloma    SPIROMETRY  11-2-2009    Total lung capacity improvement over 2007 PFT    TRANSESOPHAGEAL ECHOCARDIOGRAM  08/19/2021    with Cardioversion    TRANSESOPHAGEAL ECHOCARDIOGRAM N/A 8/19/2021    TRANSESOPHAGEAL ECHOCARDIOGRAM performed by Maya Crook MD at [place] [address]  4/21/11     Dr. Evelyn Ames; antral gastritis, hiatal hernia, & possible gastroparesis       Objective:   /61   Pulse 83   Temp 97.2 °F (36.2 °C) (Oral)   Resp 18   Ht 6' (1.829 m)   Wt 254 lb 9.6 oz (115.5 kg)   SpO2 94%   BMI 34.53 kg/m²       Intake/Output Summary (Last 24 hours) at 8/20/2021 0826  Last data filed at 8/19/2021 1803  Gross per 24 hour   Intake 2641.52 ml   Output 1000 ml   Net 1641.52 ml       TELEMETRY: NSR  Physical Exam:  General: No Respiratory distress, appears well developed and well nourished. Obese   H/O sleep apnea. Eyes:  Sclera nonicteric  Nose/Sinuses:  negative findings: nose shows no deformity, asymmetry, or inflammation, nasal mucosa normal, septum midline with no perforation or bleeding  Back:  no pain to palpation  Joint:  no active joint inflammation  Musculoskeletal:  negative  Skin:  Warm and dry  Neck:  Negative for JVD and Carotid Bruits. Chest:  Clear to auscultation, respiration easy  Cardiovascular:  RRR, S1S2 normal, no murmur, no rub or thrill.   Abdomen:  Soft normal liver and spleen  Extremities:   No edema, clubbing, cyanosis,  Pulses: pedal pulses are normal.  Neuro: intact    Medications:    sodium chloride flush  5-40 mL Intravenous 2 times per day    atorvastatin  40 mg Oral Daily    losartan  50 mg Oral Daily    amiodarone  200 mg Oral BID    pantoprazole  40 mg Oral QAM AC    apixaban  5 mg Oral BID    sodium chloride flush  5-40 mL Intravenous 2 times per day    carboxymethylcellulose PF  2 drop Both Eyes 4x Daily    dicyclomine  10 mg Oral 4x Daily AC & HS    insulin lispro  0-6 Units Subcutaneous TID WC    insulin lispro  0-3 Units Subcutaneous Nightly    metoprolol succinate  100 mg Oral Daily    glipiZIDE  5 mg Oral BID AC    metFORMIN  1,000 mg Oral BID WC    Vitamin D  2,000 Units Oral Daily    fluticasone  2 puff Inhalation BID      sodium chloride Stopped (08/19/21 1342)    sodium chloride      dextrose       loperamide, sodium chloride flush, sodium chloride, meperidine, fentanNYL, HYDROmorphone, HYDROmorphone, HYDROmorphone, hydrALAZINE, perflutren lipid microspheres, sodium chloride, acetaminophen **OR** acetaminophen, ondansetron **OR** ondansetron, polyethylene glycol, sodium chloride flush, albuterol-ipratropium, dextrose, dextrose, glucagon (rDNA), glucose    Lab Data:  CBC:   Recent Labs     08/18/21  1745 08/20/21  0528   WBC 7.5 6.6   HGB 13.6 12.7*   HCT 41.3 39.4*   MCV 90.5 92.8    170     BMP:   Recent Labs     08/18/21  1745 08/19/21  0553 08/20/21  0528    141 139   K 3.8 4.0 3.9    105 108   CO2 24 24 22   BUN 24* 23* 24*   CREATININE 1.3 1.1 1.2     LIVER PROFILE:   Recent Labs     08/18/21  1745   AST 26   ALT 23   BILITOT 0.5   ALKPHOS 101     PT/INR:   Recent Labs     08/18/21  1745   PROTIME 18.0*   INR 1.57*     APTT:   Recent Labs     08/19/21  1729 08/20/21  0011 08/20/21  0528   APTT 38.6 38.4 37.2     BNP:  No results for input(s): BNP in the last 72 hours. IMAGING:     Assessment:  Persistent afib resolved post YUKI DCCV  Cardiomyopathy EF 45% on most recent echo probably tachycardia induced cardiomyopathy  CAD without angina  Hypertension  Hyperlipidemia  Sleep apnea  Patient Active Problem List    Diagnosis Date Noted    Atrial fibrillation with rapid ventricular response ([facility] 08/18/2021    Coronary artery disease.  [facility] 8/12/21 moderate non-obstructive CAD.  08/16/2021    Aortic root dilation ([facility] 08/16/2021    Aortic regurgitation 08/16/2021    Chest pain 08/13/2021    Positive cardiac stress test     Abnormal stress test     Cardiomyopathy (HCC)     Exertional dyspnea     Dilated cardiomyopathy ([facility]     CHF (congestive heart failure) ([facility] 08/11/2021    MONICA on CPAP     At[facility] fibrillation [facility] 08/10/2021    Hypercalcemia     Type 2 diabetes mellitus without complication ([facility]     Depression 10/05/2012    Chronic abdominal pain 10/05/2012    Vertigo 07/20/2012    Meniere disease 07/20/2012    Vitamin D deficiency 02/27/2012    Hypertension 06/25/2010    Hyperlipidemia 06/25/2010    GERD (gastroesophageal reflux disease) 06/25/2010    COPD (chronic obstructive pulmonary disease) ([facility] 06/25/2010       Plan:  1. OK to discharge today I will restart lasix at 20 mg daily  2. Lower metoprolol to 50 mg daily as BP is low normal  3. He wants to follow up at [address] [facility] only has appointment next week on 8.25.21  4. Continue amiodarone 200mg BID for 4 weeks then 200mg daily  5. Monitor liver and thyroid function every six months due to amiodarone treatement  6. I will be happy to see him in cardiology clinic if his PCP wants.   · Signing off.discussed with Dr Cristian Condon MD, MD 8/20/2021 8:26 AM

## 2021-08-20 NOTE — CARE COORDINATION
DISCHARGE ORDER  Date/Time 2021 10:30 AM  Completed by: Erika Olsen RN, Case Management    Patient Name: Daniele Gupta      : 1950  Admitting Diagnosis: Atrial fibrillation with rapid ventricular response ([address] [I48.91]  Atrial fibrillation with RVR ([address] [I48.91]      Admit order Date and Status:2021  (verify MD's last order for status of admission)      Noted discharge order. If applicable PT/OT recommendation at Discharge: NA  DME recommendation by PT/OT:NA  Confirmed discharge plan: Yes  with whom__pt_____________  If pt confirmed DC plan does family need to be contacted by CM No if yes who______  Discharge Plan: Chart reviewed. Met with pt at bedside and he is returning home. Son is coming to transport him. Pt denies [facility] needs. No dc needs identified or voiced. Reviewed chart. Role of discharge planner explained and patient verbalized understanding. Discharge order is noted. Has Home O2 in place on admit:  No    Pt is being d/c'd to home today. Pt's O2 sats are 93% on RA. Discharge timeout done with Jasmina. All discharge needs and concerns addressed.

## 2021-08-20 NOTE — PLAN OF CARE
Problem: Falls - Risk of:  Goal: Will remain free from falls  Description: Will remain free from falls  8/20/2021 1038 by Ebonie Zaman RN  Outcome: Completed     Problem: Falls - Risk of:  Goal: Absence of physical injury  Description: Absence of physical injury  Outcome: Completed     Problem: SAFETY  Goal: Free from accidental physical injury  Outcome: Completed     Problem: SAFETY  Goal: Free from intentional harm  Outcome: Completed     Problem: DAILY CARE  Goal: Daily care needs are met  Outcome: Completed     Problem: PAIN  Goal: Patient's pain/discomfort is manageable  Outcome: Completed     Problem: SKIN INTEGRITY  Goal: Skin integrity is maintained or improved  Outcome: Completed     Problem: KNOWLEDGE DEFICIT  Goal: Patient/S.O. demonstrates understanding of disease process, treatment plan, medications, and discharge instructions.   Outcome: Completed     Problem: DISCHARGE BARRIERS  Goal: Patient's continuum of care needs are met  Outcome: Completed

## 2021-08-20 NOTE — PROGRESS NOTES
Discharge instructions given to pt reviewed home meds,pt verbalized understanding. [facility] Pt waiting [facility] his [facility].

## 2021-09-20 NOTE — DISCHARGE SUMMARY
Hospital Medicine Discharge Summary    Patient ID: Trang Montenegro      Patient's PCP: No primary care provider on file. Admit Date: 8/13/2021     Discharge Date: 9/19/2021    Admitting Physician: Joanna Krishnan DO     Discharge Physician: Italo Jung MD     Discharge Diagnoses: Active Hospital Problems    Diagnosis Date Noted    Chest pain [R07.9] 08/13/2021    Abnormal stress test [R94.39]     Cardiomyopathy ([address] [I42.9]     Exertional dyspnea [R06.00]        The patient was seen and examined on day of discharge and this discharge summary is in conjunction with any daily progress note from day of discharge. Condition at discharge - stable    Hospital Course: Patient is a 69-year-old male who presented to hospital for hypertension hyperlipidemia COPD, diabetes mellitus, CHF who presented to hospital from outside facility for evaluation for abnormal stress test.  Patient is currently in cardiac Cath Lab at time of my evaluation, patient cannot be physically examined/interviewed, attempted 3 times. Reportedly patient presented to hospital for abnormal stress test result performed yesterday, patient was directly taken to Cath Lab by cardiology.   Patient was also noted to have new onset A. fib at previous facility.     Assessment  Abnormal stress test, currently in cardiac Cath Lab  New onset atrial fibrillation with RVR  Hyperlipidemia  COPD  Diabetes mellitus  Hypertension  Chronic systolic heart failure  Ménière's disease        Plan  In cardiac Cath Lab, cardiology following, monitor on cardiac telemetry, Lexiscan Myoview stress test was performed and was found positive for possible ischemia, patient will be started on metoprolol, amlodipine, continue statin, losartan, check and monitor BMP  Cardiology is consulted  We will order insulin sliding scale  Currently patient is not on anticoagulation-we will defer to cardiology  Holding home Metformin/glipizide  DVT prophylaxis-Heparin    Patient was discharged by cardiology      Exam:     Ht 6' (1.829 m)   Wt 260 lb (117.9 kg)   BMI 35.26 kg/m²     Not examined at discharge, discharged by cardiology from cath lab    Consults:     IP CONSULT TO PHARMACY      Code Status:  Prior    Activity: activity as tolerated    Labs:  For convenience and continuity at follow-up the following most recent labs are provided:      CBC:    Lab Results   Component Value Date    WBC 6.6 08/20/2021    HGB 12.7 08/20/2021    HCT 39.4 08/20/2021     08/20/2021       Renal:    Lab Results   Component Value Date     08/20/2021    K 3.9 08/20/2021    K 4.0 08/19/2021     08/20/2021    CO2 22 08/20/2021    BUN 24 08/20/2021    CREATININE 1.2 08/20/2021    CALCIUM 9.6 08/20/2021       Discharge Medications:     Discharge Medication List as of 8/13/2021  3:08 PM           Details   aspirin EC 81 MG EC tablet Take 1 tablet by mouth daily, Disp-90 tablet, R-1Normal      apixaban (ELIQUIS) 5 MG TABS tablet Take 1 tablet by mouth 2 times daily, Disp-60 tablet, R-2Normal      metoprolol succinate (TOPROL XL) 50 MG extended release tablet Take 1 tablet by mouth daily Take 2 tablets by mouth (100 mg) every morning and one tablet by mouth (50 mg) every evening., Disp-90 tablet, R-1Normal              Details   atorvastatin (LIPITOR) 40 MG tablet Take 1 tablet by mouth daily, Disp-90 tablet, R-3Normal      losartan (COZAAR) 25 MG tablet Take 0.5 tablets by mouth daily, Disp-30 tablet, R-2Normal      furosemide (LASIX) 40 MG tablet Take 1 tablet by mouth daily, Disp-60 tablet, R-3Normal              Details   budesonide (PULMICORT) 180 MCG/ACT AEPB inhaler Inhale 2 puffs into the lungs 2 times dailyHistorical Med      glipiZIDE (GLUCOTROL) 5 MG tablet Take 5 mg by mouth 2 times daily (before meals) Take 2 tabs before breakfast and 1 before supperHistorical Med      metFORMIN (GLUCOPHAGE) 1000 MG tablet Take 1,000 mg by mouth 2 times daily (with meals)Historical Med      SEMAGLUTIDE, 1 MG/DOSE, SC Inject 0.25 mg into the skin once a weekHistorical Med      glycerin-hypromellose- 0.2-0.2-1 % SOLN opthalmic solution Place 2 drops into both eyes 4 times dailyHistorical Med      omeprazole (PRILOSEC) 20 MG delayed release capsule Take 20 mg by mouth dailyHistorical Med      Cholecalciferol (VITAMIN D) 50 MCG (2000 UT) CAPS capsule Take 1 capsule by mouth dailyHistorical Med      meclizine (ANTIVERT) 25 MG CHEW Take 25 mg by mouth 3 times daily as needed      dicyclomine (BENTYL) 10 MG capsule Take 10 mg by mouth 4 times daily (before meals and nightly). Time Spent on discharge is more than 30 mints in the examination, evaluation, counseling and review of medications and discharge plan. Signed:    Yvon Putnam MD   9/19/2021      Thank you No primary care provider on file. for the opportunity to be involved in this patient's care. If you have any questions or concerns please feel free to contact me at [phone].

## 2023-08-10 NOTE — PROGRESS NOTES
08/11/21 2144   NIV Type   Skin Assessment Clean, dry, & intact   Skin Protection for O2 Device Yes   NIV Started/Stopped On   Equipment Type RESPIRONICS   Mode CPAP   Mask Type Full face mask   Mask Size Large   Settings/Measurements   CPAP/EPAP 18 cmH2O   Resp 20   O2 Flow Rate (L/min) 3 L/min   Comfort Level Good   Using Accessory Muscles No   SpO2 90 1-2 cups/cans per day

## 2024-07-23 ENCOUNTER — TELEPHONE (OUTPATIENT)
Dept: CARDIAC CATH/INVASIVE PROCEDURES | Age: 74
End: 2024-07-23

## 2024-07-23 NOTE — TELEPHONE ENCOUNTER
Spoke with Patients son Bethel per verbal from patient. Bethel is aware patient is to hold Eliquis, & Metformin for 48hrs prior to cath starting today 7/23/24. As well, he is aware patient is not use and lotions, creams, and is not to take lasix or glipizide the morning of heart cath, and to remain NPO the night prior to procedure. Pt son Bethel VU of instructions, signed and held orders placed in epic.

## 2024-07-23 NOTE — TELEPHONE ENCOUNTER
Procedure:  [facility]  Doctor:  Dr. Odell ([facility] pt)  Date:  7/25/24  Time:  10am  Arrival:  8:30am  Reps:  n/a  Anesthesia:  n/a    [facility] with [facility] ROSENBAUM from [facility] [phone]. Patient had abn stress test and is scheduled for surgery next week. [facility] requested with FXW.    Spoke with patient, he asked me to speak with son Bethel as he is [place]. Please have patient arrive to the main entrance of [facility] ([address] [place], [place] [id number]) and check in with the registration desk.  They will be directed to the Cath Lab.  Please call regarding medication instructions asap. Remind patient to be NPO after midnight (8 hours prior). Do not apply lotions/creams on skin the day of procedure.       [facility] records [facility].    FXW - fyi only.

## 2024-07-24 NOTE — PROGRESS NOTES
[facility] at [facility] reviewed with Dr Razo, Creat 1.6 just last month. Dr Razo would like pt to get NS@100ml/hr for 5 hours prior to procedure. Phone call to patient, spoke to patient and patients son Kalpesh. They will attempt to arrive at 630 am instead of 830 am and are aware of 5 hours of IVF prior to case.

## 2024-07-25 ENCOUNTER — HOSPITAL ENCOUNTER (OUTPATIENT)
Age: 74
Setting detail: OBSERVATION
Discharge: HOME | End: 2024-07-26
Attending: INTERNAL MEDICINE | Admitting: INTERNAL MEDICINE
Payer: OTHER GOVERNMENT

## 2024-07-25 DIAGNOSIS — R94.39 ABNORMAL CARDIOVASCULAR STRESS TEST: ICD-10-CM

## 2024-07-25 PROBLEM — I20.0 UNSTABLE ANGINA (HCC): Status: ACTIVE | Noted: 2024-07-25

## 2024-07-25 PROBLEM — Z98.890 STATUS POST CARDIAC CATHETERIZATION: Status: ACTIVE | Noted: 2024-07-25

## 2024-07-25 LAB
ANION GAP SERPL CALCULATED.3IONS-SCNC: 11 MMOL/L (ref 3–16)
BUN SERPL-MCNC: 17 MG/DL (ref 7–20)
CALCIUM SERPL-MCNC: 9.8 MG/DL (ref 8.3–10.6)
CHLORIDE SERPL-SCNC: 101 MMOL/L (ref 99–110)
CHOLEST SERPL-MCNC: 139 MG/DL (ref 0–199)
CO2 SERPL-SCNC: 29 MMOL/L (ref 21–32)
CREAT SERPL-MCNC: 1.7 MG/DL (ref 0.8–1.3)
DEPRECATED RDW RBC AUTO: 14.6 % (ref 12.4–15.4)
EKG DIAGNOSIS: NORMAL
EKG Q-T INTERVAL: 354 MS
EKG QRS DURATION: 138 MS
EKG QTC CALCULATION (BAZETT): 504 MS
EKG R AXIS: -73 DEGREES
EKG T AXIS: -14 DEGREES
EKG VENTRICULAR RATE: 122 BPM
GFR SERPLBLD CREATININE-BSD FMLA CKD-EPI: 42 ML/MIN/{1.73_M2}
GLUCOSE BLD-MCNC: 121 MG/DL (ref 70–99)
GLUCOSE SERPL-MCNC: 146 MG/DL (ref 70–99)
HCT VFR BLD AUTO: 48.3 % (ref 40.5–52.5)
HDLC SERPL-MCNC: 39 MG/DL (ref 40–60)
HGB BLD-MCNC: 15.9 G/DL (ref 13.5–17.5)
LDLC SERPL CALC-MCNC: 77 MG/DL
MCH RBC QN AUTO: 32.3 PG (ref 26–34)
MCHC RBC AUTO-ENTMCNC: 33 G/DL (ref 31–36)
MCV RBC AUTO: 98 FL (ref 80–100)
PERFORMED ON: ABNORMAL
PLATELET # BLD AUTO: 216 K/UL (ref 135–450)
PMV BLD AUTO: 8.1 FL (ref 5–10.5)
POC ACT LR: 269 SEC
POC ACT LR: 323 SEC
POC ACT LR: 329 SEC
POTASSIUM SERPL-SCNC: 3.8 MMOL/L (ref 3.5–5.1)
RBC # BLD AUTO: 4.93 M/UL (ref 4.2–5.9)
SODIUM SERPL-SCNC: 141 MMOL/L (ref 136–145)
TRIGL SERPL-MCNC: 115 MG/DL (ref 0–150)
VLDLC SERPL CALC-MCNC: 23 MG/DL
WBC # BLD AUTO: 5.8 K/UL (ref 4–11)

## 2024-07-25 PROCEDURE — 92928 PRQ TCAT PLMT NTRAC ST 1 LES: CPT | Performed by: INTERNAL MEDICINE

## 2024-07-25 PROCEDURE — 92978 ENDOLUMINL IVUS OCT C 1ST: CPT | Performed by: INTERNAL MEDICINE

## 2024-07-25 PROCEDURE — C1874 STENT, COATED/COV W/DEL SYS: HCPCS | Performed by: INTERNAL MEDICINE

## 2024-07-25 PROCEDURE — 80048 BASIC METABOLIC PNL TOTAL CA: CPT

## 2024-07-25 PROCEDURE — G0378 HOSPITAL OBSERVATION PER HR: HCPCS

## 2024-07-25 PROCEDURE — 6360000002 HC RX W HCPCS: Performed by: INTERNAL MEDICINE

## 2024-07-25 PROCEDURE — 6360000004 HC RX CONTRAST MEDICATION: Performed by: INTERNAL MEDICINE

## 2024-07-25 PROCEDURE — 85027 COMPLETE CBC AUTOMATED: CPT

## 2024-07-25 PROCEDURE — 99153 MOD SED SAME PHYS/QHP EA: CPT | Performed by: INTERNAL MEDICINE

## 2024-07-25 PROCEDURE — 93005 ELECTROCARDIOGRAM TRACING: CPT | Performed by: INTERNAL MEDICINE

## 2024-07-25 PROCEDURE — 85347 COAGULATION TIME ACTIVATED: CPT

## 2024-07-25 PROCEDURE — 96361 HYDRATE IV INFUSION ADD-ON: CPT

## 2024-07-25 PROCEDURE — C1894 INTRO/SHEATH, NON-LASER: HCPCS | Performed by: INTERNAL MEDICINE

## 2024-07-25 PROCEDURE — 7100000011 HC PHASE II RECOVERY - ADDTL 15 MIN: Performed by: INTERNAL MEDICINE

## 2024-07-25 PROCEDURE — C1769 GUIDE WIRE: HCPCS | Performed by: INTERNAL MEDICINE

## 2024-07-25 PROCEDURE — 99152 MOD SED SAME PHYS/QHP 5/>YRS: CPT | Performed by: INTERNAL MEDICINE

## 2024-07-25 PROCEDURE — 93458 L HRT ARTERY/VENTRICLE ANGIO: CPT | Performed by: INTERNAL MEDICINE

## 2024-07-25 PROCEDURE — C1753 CATH, INTRAVAS ULTRASOUND: HCPCS | Performed by: INTERNAL MEDICINE

## 2024-07-25 PROCEDURE — 80061 LIPID PANEL: CPT

## 2024-07-25 PROCEDURE — 6370000000 HC RX 637 (ALT 250 FOR IP): Performed by: INTERNAL MEDICINE

## 2024-07-25 PROCEDURE — 96360 HYDRATION IV INFUSION INIT: CPT

## 2024-07-25 PROCEDURE — C1725 CATH, TRANSLUMIN NON-LASER: HCPCS | Performed by: INTERNAL MEDICINE

## 2024-07-25 PROCEDURE — 94640 AIRWAY INHALATION TREATMENT: CPT

## 2024-07-25 PROCEDURE — C1887 CATHETER, GUIDING: HCPCS | Performed by: INTERNAL MEDICINE

## 2024-07-25 PROCEDURE — 93571 IV DOP VEL&/PRESS C FLO 1ST: CPT | Performed by: INTERNAL MEDICINE

## 2024-07-25 PROCEDURE — 2500000003 HC RX 250 WO HCPCS: Performed by: INTERNAL MEDICINE

## 2024-07-25 PROCEDURE — 7100000010 HC PHASE II RECOVERY - FIRST 15 MIN: Performed by: INTERNAL MEDICINE

## 2024-07-25 PROCEDURE — 2709999900 HC NON-CHARGEABLE SUPPLY: Performed by: INTERNAL MEDICINE

## 2024-07-25 PROCEDURE — 93010 ELECTROCARDIOGRAM REPORT: CPT | Performed by: INTERNAL MEDICINE

## 2024-07-25 PROCEDURE — 2580000003 HC RX 258: Performed by: INTERNAL MEDICINE

## 2024-07-25 DEVICE — XIENCE SIERRA™ EVEROLIMUS ELUTING CORONARY STENT SYSTEM 3.00 MM X 38 MM / RAPID-EXCHANGE
Type: IMPLANTABLE DEVICE | Status: FUNCTIONAL
Brand: XIENCE SIERRA™

## 2024-07-25 DEVICE — XIENCE SIERRA™ EVEROLIMUS ELUTING CORONARY STENT SYSTEM 3.50 MM X 18 MM / RAPID-EXCHANGE
Type: IMPLANTABLE DEVICE | Status: FUNCTIONAL
Brand: XIENCE SIERRA™

## 2024-07-25 RX ORDER — FLUTICASONE PROPIONATE 110 UG/1
1 AEROSOL, METERED RESPIRATORY (INHALATION)
Status: DISCONTINUED | OUTPATIENT
Start: 2024-07-25 | End: 2024-07-26 | Stop reason: HOSPADM

## 2024-07-25 RX ORDER — METOPROLOL SUCCINATE 25 MG/1
50 TABLET, EXTENDED RELEASE ORAL DAILY
Status: DISCONTINUED | OUTPATIENT
Start: 2024-07-26 | End: 2024-07-26 | Stop reason: HOSPADM

## 2024-07-25 RX ORDER — SODIUM CHLORIDE 9 MG/ML
INJECTION, SOLUTION INTRAVENOUS PRN
Status: DISCONTINUED | OUTPATIENT
Start: 2024-07-25 | End: 2024-07-26 | Stop reason: HOSPADM

## 2024-07-25 RX ORDER — SODIUM CHLORIDE 0.9 % (FLUSH) 0.9 %
5-40 SYRINGE (ML) INJECTION EVERY 12 HOURS SCHEDULED
Status: DISCONTINUED | OUTPATIENT
Start: 2024-07-25 | End: 2024-07-26 | Stop reason: HOSPADM

## 2024-07-25 RX ORDER — SODIUM CHLORIDE 0.9 % (FLUSH) 0.9 %
5-40 SYRINGE (ML) INJECTION PRN
Status: DISCONTINUED | OUTPATIENT
Start: 2024-07-25 | End: 2024-07-26 | Stop reason: HOSPADM

## 2024-07-25 RX ORDER — MIDAZOLAM HYDROCHLORIDE 1 MG/ML
INJECTION INTRAMUSCULAR; INTRAVENOUS PRN
Status: DISCONTINUED | OUTPATIENT
Start: 2024-07-25 | End: 2024-07-25 | Stop reason: HOSPADM

## 2024-07-25 RX ORDER — LORAZEPAM 0.5 MG/1
0.5 TABLET ORAL
Status: DISCONTINUED | OUTPATIENT
Start: 2024-07-25 | End: 2024-07-25 | Stop reason: HOSPADM

## 2024-07-25 RX ORDER — CLOPIDOGREL 300 MG/1
TABLET, FILM COATED ORAL PRN
Status: DISCONTINUED | OUTPATIENT
Start: 2024-07-25 | End: 2024-07-25 | Stop reason: HOSPADM

## 2024-07-25 RX ORDER — ONDANSETRON 2 MG/ML
4 INJECTION INTRAMUSCULAR; INTRAVENOUS EVERY 6 HOURS PRN
Status: DISCONTINUED | OUTPATIENT
Start: 2024-07-25 | End: 2024-07-25 | Stop reason: HOSPADM

## 2024-07-25 RX ORDER — HEPARIN SODIUM 1000 [USP'U]/ML
INJECTION, SOLUTION INTRAVENOUS; SUBCUTANEOUS PRN
Status: DISCONTINUED | OUTPATIENT
Start: 2024-07-25 | End: 2024-07-25 | Stop reason: HOSPADM

## 2024-07-25 RX ORDER — SODIUM CHLORIDE 0.9 % (FLUSH) 0.9 %
5-40 SYRINGE (ML) INJECTION EVERY 12 HOURS SCHEDULED
Status: DISCONTINUED | OUTPATIENT
Start: 2024-07-25 | End: 2024-07-25 | Stop reason: HOSPADM

## 2024-07-25 RX ORDER — AMIODARONE HYDROCHLORIDE 200 MG/1
200 TABLET ORAL DAILY
Status: DISCONTINUED | OUTPATIENT
Start: 2024-07-26 | End: 2024-07-26 | Stop reason: HOSPADM

## 2024-07-25 RX ORDER — ACETAMINOPHEN 325 MG/1
650 TABLET ORAL EVERY 4 HOURS PRN
Status: DISCONTINUED | OUTPATIENT
Start: 2024-07-25 | End: 2024-07-26 | Stop reason: HOSPADM

## 2024-07-25 RX ORDER — ATORVASTATIN CALCIUM 10 MG/1
20 TABLET, FILM COATED ORAL DAILY
Status: DISCONTINUED | OUTPATIENT
Start: 2024-07-25 | End: 2024-07-26 | Stop reason: HOSPADM

## 2024-07-25 RX ORDER — SODIUM CHLORIDE 0.9 % (FLUSH) 0.9 %
5-40 SYRINGE (ML) INJECTION PRN
Status: DISCONTINUED | OUTPATIENT
Start: 2024-07-25 | End: 2024-07-25 | Stop reason: HOSPADM

## 2024-07-25 RX ORDER — LOSARTAN POTASSIUM 25 MG/1
12.5 TABLET ORAL DAILY
Status: DISCONTINUED | OUTPATIENT
Start: 2024-07-26 | End: 2024-07-26 | Stop reason: HOSPADM

## 2024-07-25 RX ORDER — ASPIRIN 325 MG
325 TABLET ORAL ONCE
Status: COMPLETED | OUTPATIENT
Start: 2024-07-25 | End: 2024-07-25

## 2024-07-25 RX ORDER — SODIUM CHLORIDE 9 MG/ML
INJECTION, SOLUTION INTRAVENOUS CONTINUOUS
Status: ACTIVE | OUTPATIENT
Start: 2024-07-25 | End: 2024-07-26

## 2024-07-25 RX ORDER — FENTANYL CITRATE 50 UG/ML
INJECTION, SOLUTION INTRAMUSCULAR; INTRAVENOUS PRN
Status: DISCONTINUED | OUTPATIENT
Start: 2024-07-25 | End: 2024-07-25 | Stop reason: HOSPADM

## 2024-07-25 RX ORDER — SODIUM CHLORIDE 9 MG/ML
INJECTION, SOLUTION INTRAVENOUS PRN
Status: DISCONTINUED | OUTPATIENT
Start: 2024-07-25 | End: 2024-07-25 | Stop reason: HOSPADM

## 2024-07-25 RX ADMIN — SODIUM CHLORIDE: 9 INJECTION, SOLUTION INTRAVENOUS at 16:43

## 2024-07-25 RX ADMIN — Medication 1 PUFF: at 19:39

## 2024-07-25 RX ADMIN — Medication 10 ML: at 07:13

## 2024-07-25 RX ADMIN — SODIUM CHLORIDE: 9 INJECTION, SOLUTION INTRAVENOUS at 07:13

## 2024-07-25 RX ADMIN — ASPIRIN 325 MG: 325 TABLET ORAL at 07:15

## 2024-07-25 ASSESSMENT — PAIN SCALES - GENERAL
PAINLEVEL_OUTOF10: 0

## 2024-07-25 NOTE — PROGRESS NOTES
Patient just arrived to unit from cath lab. Right radial site has transparent dressing in place, with armboard. No hematoma or active bleeding. PPP. Educating on restrictions. Patient verbalized understanding. Will continue to monitor.     BP 94/66   Pulse (!) 114   Resp 20   Ht 1.829 m (6')   Wt 114.8 kg (253 lb)   SpO2 95%   BMI 34.31 kg/m²

## 2024-07-25 NOTE — PROGRESS NOTES
Report given to C4 ROBI Mcduffie. [facility] notified of upcoming transfer to room 427. Tolerated dinner. Fluids infusing @ 100ml/hr.  8ml air removed from TR band thus far. Site remains soft/dry/dressing intact. VSS room air, afib on tele.

## 2024-07-25 NOTE — DISCHARGE INSTRUCTIONS
Cardiac Rehabilitation  A Comprehensive Approach to Recovery        What is Cardiac Rehabilitation:    The [facility] Cardiac Rehabilitation Program is designed to provide medically supervised, progressive exercise and education from our experienced staff. The program helps restore each individual to his or her highest level of physical, psychological, social and vocational wellness.     Goals of Cardiac Rehabilitation include:    Increased understanding of heart disease, its prevention and treatment  Increased strength and stamina  Assist in making the lifestyle changes necessary to lessen the risk of future heart problems  Provide support and motivation    Phase II Rehabilitation:    You will visit our team 3 days a week for up to 36 visits. We will work with you to provide the following:    Exercise training to promote cardiovascular health, build muscle and improve flexibility  Education and emotional support regarding heart disease  Smoking cessation information, if indicated  Relaxation and stress management techniques  Nutrition counseling  Information about your medications.   Discussion of exercise maintenance after rehabilitation is completed    A nurse trained in cardiac care supervises all sessions. Your physician will receive periodic reports from our staff. Should a problem occur during a session, the staff would notify your physician.     Your physician has referred you to Cardiac Rehab. Please call our department about 1 week after discharge from the hospital to arrange your first appointment. We look forward to working with you.     The Cardiac Rehab Staff at [facility] [phone].

## 2024-07-25 NOTE — PROGRESS NOTES
PRELIMINARY PROCEDURE REPORT      PROCEDURE FINDINGS  Successful left heart cath via right radial approach showing one-vessel obstructive CAD involving proximal and mid LAD  FFR evaluation of LAD  IVUS guided PCI and placement of 2 overlapping drug-eluting stents in proximal and mid LAD      PLAN/RECOMMENDATIONS  Start Plavix 75 mg once daily  Resume Eliquis tomorrow morning if no access site complications, will stop aspirin given concomitant anticoagulation and Plavix  Admit for overnight observation  IV hydration for 12 hours  Will discharge tomorrow with close cardi[facility] and [facility] if no hemodynamic or access site complications overnight      No complications.    See full report for details.      Aristides Razo MD, FACC, [facility]  Interventional Cardiology  [facility]  [phone] (c)

## 2024-07-25 NOTE — H&P
Start Date End Date Taking? Authorizing Provider   amiodarone (CORDARONE) 200 MG tablet 200 bid- 4 weeks, 200 daily thereafter 8/20/21   Porsche Casper MD   metoprolol succinate (TOPROL XL) 50 MG extended release tablet Take 1 tablet by mouth daily 8/20/21   Porsche Casper MD   furosemide (LASIX) 40 MG tablet Take 0.5 tablets by mouth daily 8/20/21   Porsche Casper MD   apixaban (ELIQUIS) 5 MG TABS tablet Take 1 tablet by mouth 2 times daily    Brian Segal MD   atorvastatin (LIPITOR) 40 MG tablet Take 1 tablet by mouth daily  Patient taking differently: Take 0.5 tablets by mouth daily Take 1/2 tab daily 8/13/21   Preston Travis DO   losartan (COZAAR) 25 MG tablet Take 0.5 tablets by mouth daily 8/13/21   Preston Travis DO   budesonide (PULMICORT) 180 MCG/ACT AEPB inhaler Inhale 2 puffs into the lungs in the morning and 2 puffs in the evening.    Brian Segal MD   glipiZIDE (GLUCOTROL) 5 MG tablet Take 1 tablet by mouth 2 times daily (before meals) Take 2 tabs before breakfast and 1 before supper    Brian Segal MD   metFORMIN (GLUCOPHAGE) 1000 MG tablet Take 1 tablet by mouth 2 times daily (with meals)    Brian Segal MD   SEMAGLUTIDE, 1 MG/DOSE, SC Inject 0.25 mg into the skin once a week  Patient not taking: Reported on 7/25/2024    Brian Segal MD   glycerin-hypromellose- 0.2-0.2-1 % SOLN opthalmic solution Place 2 drops into both eyes 4 times daily  Patient not taking: Reported on 7/25/2024    Brian Segal MD   omeprazole (PRILOSEC) 20 MG delayed release capsule Take 1 capsule by mouth daily    Brian Segal MD   Cholecalciferol (VITAMIN D) 50 MCG (2000 UT) CAPS capsule Take 1 capsule by mouth daily    Brian Segal MD     General appearance - alert, cooperative, no distress, appears stated age  Neck - Supple, symmetrical, trachea midline, no adenopathy, thyroid: not enlarged, symmetric, no

## 2024-07-26 VITALS
BODY MASS INDEX: 34.27 KG/M2 | HEART RATE: 88 BPM | RESPIRATION RATE: 20 BRPM | DIASTOLIC BLOOD PRESSURE: 84 MMHG | HEIGHT: 72 IN | SYSTOLIC BLOOD PRESSURE: 142 MMHG | OXYGEN SATURATION: 92 % | TEMPERATURE: 97.5 F | WEIGHT: 253 LBS

## 2024-07-26 LAB
ANION GAP SERPL CALCULATED.3IONS-SCNC: 8 MMOL/L (ref 3–16)
BUN SERPL-MCNC: 16 MG/DL (ref 7–20)
CALCIUM SERPL-MCNC: 9.1 MG/DL (ref 8.3–10.6)
CHLORIDE SERPL-SCNC: 108 MMOL/L (ref 99–110)
CO2 SERPL-SCNC: 26 MMOL/L (ref 21–32)
CREAT SERPL-MCNC: 1.4 MG/DL (ref 0.8–1.3)
DEPRECATED RDW RBC AUTO: 14.6 % (ref 12.4–15.4)
EKG ATRIAL RATE: 57 BPM
EKG DIAGNOSIS: NORMAL
EKG Q-T INTERVAL: 376 MS
EKG QRS DURATION: 138 MS
EKG QTC CALCULATION (BAZETT): 494 MS
EKG R AXIS: -54 DEGREES
EKG T AXIS: -16 DEGREES
EKG VENTRICULAR RATE: 104 BPM
GFR SERPLBLD CREATININE-BSD FMLA CKD-EPI: 53 ML/MIN/{1.73_M2}
GLUCOSE SERPL-MCNC: 106 MG/DL (ref 70–99)
HCT VFR BLD AUTO: 40.4 % (ref 40.5–52.5)
HGB BLD-MCNC: 13.3 G/DL (ref 13.5–17.5)
MCH RBC QN AUTO: 32.6 PG (ref 26–34)
MCHC RBC AUTO-ENTMCNC: 32.9 G/DL (ref 31–36)
MCV RBC AUTO: 99.1 FL (ref 80–100)
PLATELET # BLD AUTO: 163 K/UL (ref 135–450)
PMV BLD AUTO: 8.3 FL (ref 5–10.5)
POTASSIUM SERPL-SCNC: 4 MMOL/L (ref 3.5–5.1)
RBC # BLD AUTO: 4.08 M/UL (ref 4.2–5.9)
SODIUM SERPL-SCNC: 142 MMOL/L (ref 136–145)
WBC # BLD AUTO: 4.4 K/UL (ref 4–11)

## 2024-07-26 PROCEDURE — 80048 BASIC METABOLIC PNL TOTAL CA: CPT

## 2024-07-26 PROCEDURE — G0378 HOSPITAL OBSERVATION PER HR: HCPCS

## 2024-07-26 PROCEDURE — 99238 HOSP IP/OBS DSCHRG MGMT 30/<: CPT | Performed by: INTERNAL MEDICINE

## 2024-07-26 PROCEDURE — 6370000000 HC RX 637 (ALT 250 FOR IP): Performed by: INTERNAL MEDICINE

## 2024-07-26 PROCEDURE — 96361 HYDRATE IV INFUSION ADD-ON: CPT

## 2024-07-26 PROCEDURE — 85027 COMPLETE CBC AUTOMATED: CPT

## 2024-07-26 PROCEDURE — 36415 COLL VENOUS BLD VENIPUNCTURE: CPT

## 2024-07-26 PROCEDURE — 93010 ELECTROCARDIOGRAM REPORT: CPT | Performed by: INTERNAL MEDICINE

## 2024-07-26 PROCEDURE — 2580000003 HC RX 258: Performed by: INTERNAL MEDICINE

## 2024-07-26 RX ORDER — CLOPIDOGREL BISULFATE 75 MG/1
75 TABLET ORAL DAILY
Status: DISCONTINUED | OUTPATIENT
Start: 2024-07-26 | End: 2024-07-26 | Stop reason: HOSPADM

## 2024-07-26 RX ORDER — CLOPIDOGREL BISULFATE 75 MG/1
75 TABLET ORAL DAILY
Qty: 30 TABLET | Refills: 5 | Status: SHIPPED | OUTPATIENT
Start: 2024-07-26

## 2024-07-26 RX ADMIN — APIXABAN 5 MG: 5 TABLET, FILM COATED ORAL at 09:13

## 2024-07-26 RX ADMIN — APIXABAN 5 MG: 5 TABLET, FILM COATED ORAL at 00:23

## 2024-07-26 RX ADMIN — Medication 10 ML: at 00:23

## 2024-07-26 NOTE — PROGRESS NOTES
4 Eyes Skin Assessment     The patient is being assess for  Admission    I agree that 2 RN's have performed a thorough Head to Toe Skin Assessment on the patient. ALL assessment sites listed below have been assessed.       Areas assessed by both nurses: Michaela  [x]   Head, Face, and Ears   [x]   Shoulders, Back, and Chest  [x]   Arms, Elbows, and Hands   [x]   Coccyx, Sacrum, and Ischum  [x]   Legs, Feet, and Heels        Does the Patient have Skin Breakdown?  No         Filiberto Prevention initiated:  No   Wound Care Orders initiated:  No      [facility] nurse consulted for Pressure Injury (Stage 3,4, Unstageable, DTI, NWPT, and Complex wounds):  No      Nurse 1 eSignature: Electronically signed by Reta Collins RN on 7/25/24 at 8:08 PM EDT    **SHARE this note so that the co-signing nurse is able to place an eSignature**    Nurse 2 eSignature: Electronically signed by Bird Stewart RN on 7/25/24 at 8:11 PM EDT

## 2024-07-26 NOTE — PLAN OF CARE
Possible d/c 7/26/24. Has some renal issues but perhaps can be followed up on an OP basis. MD to decide.

## 2024-07-26 NOTE — PLAN OF CARE
Problem: Chronic Conditions and Co-morbidities  Goal: Patient's chronic conditions and co-morbidity symptoms are monitored and maintained or improved  7/26/2024 1044 by Juanita Cruz RN  Outcome: Completed  7/26/2024 0349 by Adelaide Hernandez RN  Outcome: Progressing  Flowsheets (Taken 7/25/2024 1800 by Collins, Reta A, RN)  Care Plan - Patient's Chronic Conditions and Co-Morbidity Symptoms are Monitored and Maintained or Improved: Monitor and assess patient's chronic conditions and comorbid symptoms for stability, deterioration, or improvement     Problem: Discharge Planning  Goal: Discharge to home or other facility with appropriate resources  7/26/2024 1044 by Juanita Cruz RN  Outcome: Completed  7/26/2024 0349 by Adelaide Hernandez RN  Outcome: Progressing  Flowsheets  Taken 7/25/2024 1800 by Reta Collins RN  Discharge to home or other facility with appropriate resources: Identify barriers to discharge with patient and caregiver  Taken 7/25/2024 1716 by Reta Collins RN  Discharge to home or other facility with appropriate resources: Identify barriers to discharge with patient and caregiver     Problem: Safety - Adult  Goal: Free from fall injury  Outcome: Completed     Problem: Pain  Goal: Verbalizes/displays adequate comfort level or baseline comfort level  Outcome: Completed

## 2024-07-26 NOTE — CARE COORDINATION
Case Management Assessment  Initial Evaluation    Date/Time of Evaluation: 7/26/2024 9:48 AM  Assessment Completed by: Sravani Rockwell RN    If patient is discharged prior to next notation, then this note serves as note for discharge by case management.    Patient Name: Yaniv Hernandez                   YOB: 1950  Diagnosis: Abnormal cardiovascular stress test [R94.39]  Status post cardiac catheterization [Z98.890]  Unstable angina (HCC) [I20.0]                   Date / Time: 7/25/2024  6:16 AM    Patient Admission Status: Observation   Readmission Risk (Low < 19, Mod (19-27), High > 27): No data recorded  Current PCP: No primary care provider on file.  PCP verified by CM? (P) Yes (Seen at [facility])    Chart Reviewed: Yes      History Provided by: (P) Patient  Patient Orientation: (P) Alert and Oriented    Patient Cognition: (P) Alert    Hospitalization in the last 30 days (Readmission):  No    If yes, Readmission Assessment in CM Navigator will be completed.    Advance Directives:      Code Status: Full Code   Patient's Primary Decision Maker is: (P) Legal Next of Kin    Primary Decision Maker: KingIvelisse - Child - [phone]    Primary Decision Maker: Bethel Hernandez - Child - [phone]    Discharge Planning:    Patient lives with: (P) Children Type of Home: (P) House  Primary Care Giver: (P) Self  Patient Support Systems include: (P) Children   Current Financial resources: (P) [place]  Current community resources: (P) None  Current services prior to admission: (P) None            Current DME:              Type of Home Care services:  (P) None    ADLS  Prior functional level: (P) Independent in ADLs/IADLs  Current functional level: (P) Independent in ADLs/IADLs    PT AM-PAC:   /24  OT AM-PAC:   /24    Family can provide assistance at DC: (P) Yes  Would you like Case Management to discuss the discharge plan with any other family members/significant others, and if so, who? (P) Yes (Bethel doll

## 2024-07-28 LAB — ECHO BSA: 2.41 M2

## 2024-07-29 NOTE — DISCHARGE SUMMARY
[facility]  Discharge Summary  Patient ID:  Yaniv Hernandez  [id number] y.o. 1950    Admit date: 7/25/2024    Discharge date: 7/26/2024     Admitting Provider: Tavares Bustamante MD     Discharge Provider: TAVARES BUSTAMANTE MD     Admission Diagnoses: Abnormal cardiovascular stress test [R94.39]  Status post cardiac catheterization [Z98.890]  Unstable angina (HCC) [I20.0]    Discharge Diagnoses:   Patient Active Problem List   Diagnosis    Hypertension    Hyperlipidemia    GERD (gastroesophageal reflux disease)    COPD (chronic obstructive pulmonary disease) (HCC)    Vitamin D deficiency    Vertigo    Meniere disease    Depression    Chronic abdominal pain    Atrial fibrillation with RVR (HCC)    Hypercalcemia    Type 2 diabetes mellitus without complication (HCC)    CHF (congestive heart failure) (HCC)    MONICA on CPAP    Dilated cardiomyopathy (HCC)    Positive cardiac stress test    Chest pain    Abnormal stress test    Cardiomyopathy (HCC)    Exertional dyspnea    Coronary artery disease. [facility] 8/12/21 moderate non-obstructive CAD.     Aortic root dilation (HCC)    Aortic regurgitation    Atrial fibrillation with rapid ventricular response (HCC)    Status post cardiac catheterization    Unstable angina (HCC)        Discharged Condition: stable  The patient was seen and examined on day of discharge and this discharge summary is in conjunction with any daily progress note from day of discharge.    Hospital Course: Yaniv Hernandez was admitted on elective basis after workup for shortness of breath at the [facility] revealed an abnormal stress test and he was scheduled for an outpatient coronary angiogram to define coronary anatomy and rule out obstructive CAD.  He underwent an uneventful coronary angiogram and left heart cath yesterday that revealed hemodynamically significant stenosis in proximal to mid LAD which was treated with PCI with 2 overlapping drug-eluting stents without complication.  He was
